# Patient Record
Sex: FEMALE | Race: WHITE | NOT HISPANIC OR LATINO | URBAN - METROPOLITAN AREA
[De-identification: names, ages, dates, MRNs, and addresses within clinical notes are randomized per-mention and may not be internally consistent; named-entity substitution may affect disease eponyms.]

---

## 2017-10-24 PROBLEM — Z00.00 ENCOUNTER FOR PREVENTIVE HEALTH EXAMINATION: Status: ACTIVE | Noted: 2017-10-24

## 2018-06-18 ENCOUNTER — OUTPATIENT (OUTPATIENT)
Dept: OUTPATIENT SERVICES | Facility: HOSPITAL | Age: 68
LOS: 1 days | End: 2018-06-18
Payer: MEDICARE

## 2018-06-18 VITALS — WEIGHT: 164.91 LBS | HEIGHT: 61 IN

## 2018-06-18 VITALS
RESPIRATION RATE: 18 BRPM | WEIGHT: 164.91 LBS | OXYGEN SATURATION: 98 % | DIASTOLIC BLOOD PRESSURE: 78 MMHG | HEIGHT: 61.5 IN | TEMPERATURE: 97 F | SYSTOLIC BLOOD PRESSURE: 130 MMHG | HEART RATE: 73 BPM

## 2018-06-18 DIAGNOSIS — Z01.810 ENCOUNTER FOR PREPROCEDURAL CARDIOVASCULAR EXAMINATION: ICD-10-CM

## 2018-06-18 DIAGNOSIS — Z98.51 TUBAL LIGATION STATUS: Chronic | ICD-10-CM

## 2018-06-18 DIAGNOSIS — Z98.891 HISTORY OF UTERINE SCAR FROM PREVIOUS SURGERY: Chronic | ICD-10-CM

## 2018-06-18 DIAGNOSIS — Z90.49 ACQUIRED ABSENCE OF OTHER SPECIFIED PARTS OF DIGESTIVE TRACT: Chronic | ICD-10-CM

## 2018-06-18 LAB
ANION GAP SERPL CALC-SCNC: 15 MMOL/L — SIGNIFICANT CHANGE UP (ref 5–17)
APTT BLD: 31.5 SEC — SIGNIFICANT CHANGE UP (ref 27.5–37.4)
BLD GP AB SCN SERPL QL: SIGNIFICANT CHANGE UP
BUN SERPL-MCNC: 15 MG/DL — SIGNIFICANT CHANGE UP (ref 8–20)
CALCIUM SERPL-MCNC: 9.4 MG/DL — SIGNIFICANT CHANGE UP (ref 8.6–10.2)
CHLORIDE SERPL-SCNC: 102 MMOL/L — SIGNIFICANT CHANGE UP (ref 98–107)
CO2 SERPL-SCNC: 23 MMOL/L — SIGNIFICANT CHANGE UP (ref 22–29)
CREAT SERPL-MCNC: 0.66 MG/DL — SIGNIFICANT CHANGE UP (ref 0.5–1.3)
GLUCOSE SERPL-MCNC: 97 MG/DL — SIGNIFICANT CHANGE UP (ref 70–115)
HCT VFR BLD CALC: 40.3 % — SIGNIFICANT CHANGE UP (ref 37–47)
HGB BLD-MCNC: 13.2 G/DL — SIGNIFICANT CHANGE UP (ref 12–16)
INR BLD: 0.96 RATIO — SIGNIFICANT CHANGE UP (ref 0.88–1.16)
MAGNESIUM SERPL-MCNC: 2 MG/DL — SIGNIFICANT CHANGE UP (ref 1.6–2.6)
MCHC RBC-ENTMCNC: 27.8 PG — SIGNIFICANT CHANGE UP (ref 27–31)
MCHC RBC-ENTMCNC: 32.8 G/DL — SIGNIFICANT CHANGE UP (ref 32–36)
MCV RBC AUTO: 84.8 FL — SIGNIFICANT CHANGE UP (ref 81–99)
PLATELET # BLD AUTO: 306 K/UL — SIGNIFICANT CHANGE UP (ref 150–400)
POTASSIUM SERPL-MCNC: 4.3 MMOL/L — SIGNIFICANT CHANGE UP (ref 3.5–5.3)
POTASSIUM SERPL-SCNC: 4.3 MMOL/L — SIGNIFICANT CHANGE UP (ref 3.5–5.3)
PROTHROM AB SERPL-ACNC: 10.6 SEC — SIGNIFICANT CHANGE UP (ref 9.8–12.7)
RBC # BLD: 4.75 M/UL — SIGNIFICANT CHANGE UP (ref 4.4–5.2)
RBC # FLD: 14.2 % — SIGNIFICANT CHANGE UP (ref 11–15.6)
SODIUM SERPL-SCNC: 140 MMOL/L — SIGNIFICANT CHANGE UP (ref 135–145)
TYPE + AB SCN PNL BLD: SIGNIFICANT CHANGE UP
WBC # BLD: 8.6 K/UL — SIGNIFICANT CHANGE UP (ref 4.8–10.8)
WBC # FLD AUTO: 8.6 K/UL — SIGNIFICANT CHANGE UP (ref 4.8–10.8)

## 2018-06-18 PROCEDURE — 93010 ELECTROCARDIOGRAM REPORT: CPT

## 2018-06-18 NOTE — H&P PST ADULT - HISTORY OF PRESENT ILLNESS
68 year old female patient with a history of a seasonal allergies, hypertension, dyslipidemia who presents with progressive palpitations associated with dyspnea and fatigue. The patient reports that she recently gained a lot of weight but also reports worsening episodes of palpitations which are starting to limit her exercise capacity. She reports that the palpitations usually occur after exertion and get better with rest. She denies associated chest pain or syncope. No recent fever or chills. She admit that her chronic back pain has limited her exercise which she blames on her weight gain. 68 year old female patient with a history of a seasonal allergies, hypertension, dyslipidemia, GERD, IBS, and chronic back pain who presents with progressive palpitations associated with dyspnea and fatigue. The patient reports that she recently gained a lot of weight but also reports worsening episodes of palpitations which are starting to limit her exercise capacity. She reports that the palpitations usually occur after exertion and get better with rest. She denies associated chest pain or syncope. No recent fever or chills. She admit that her chronic back pain has limited her exercise which she blames on her weight gain.   She does admit to a distant episode of syncope which was attributed to a vasovagal nature    Echo: 4/19/18: EF 65%, grade I diastolic dysfunction.   Nuclear PET: 10/6/17: normal myocardial perfusion imaging without evidence of ischemia.

## 2018-06-18 NOTE — H&P PST ADULT - ASSESSMENT
68 year old female patient with a history of a seasonal allergies, hypertension, dyslipidemia, GERD, IBS, and chronic back pain who presents with progressive palpitations. Plan for EPS possible ablation possible loop, possible pacemaker    - NPO post midnight day of the procedure  - Will hold metoprolol two days prior to procedure.

## 2018-06-18 NOTE — ASU PATIENT PROFILE, ADULT - PMH
Abnormal EKG    HTN (hypertension)    Palpitations    PVC (premature ventricular contraction)  infrequent  RBBB    Syncope  recurrent Abnormal EKG    Compression fracture of lumbar vertebra  L 4--  L7  Compression fracture of thoracic vertebra  T  4--T  7  GERD (gastroesophageal reflux disease)    HTN (hypertension)    IBS (irritable bowel syndrome)    Osteoporosis    Palpitations    Pelvic fracture  hairline fractures  bilateral,  non-traumatic  PVC (premature ventricular contraction)  infrequent  RBBB    Spinal stenosis  L4-  L7  Syncope  recurrent  Thyroid nodule

## 2018-06-21 ENCOUNTER — OUTPATIENT (OUTPATIENT)
Dept: OUTPATIENT SERVICES | Facility: HOSPITAL | Age: 68
LOS: 1 days | End: 2018-06-21
Payer: MEDICARE

## 2018-06-21 ENCOUNTER — TRANSCRIPTION ENCOUNTER (OUTPATIENT)
Age: 68
End: 2018-06-21

## 2018-06-21 VITALS
RESPIRATION RATE: 17 BRPM | SYSTOLIC BLOOD PRESSURE: 151 MMHG | TEMPERATURE: 98 F | OXYGEN SATURATION: 97 % | DIASTOLIC BLOOD PRESSURE: 67 MMHG | HEART RATE: 111 BPM

## 2018-06-21 VITALS
HEART RATE: 93 BPM | OXYGEN SATURATION: 96 % | SYSTOLIC BLOOD PRESSURE: 150 MMHG | DIASTOLIC BLOOD PRESSURE: 87 MMHG | RESPIRATION RATE: 18 BRPM

## 2018-06-21 DIAGNOSIS — Z90.49 ACQUIRED ABSENCE OF OTHER SPECIFIED PARTS OF DIGESTIVE TRACT: Chronic | ICD-10-CM

## 2018-06-21 DIAGNOSIS — R55 SYNCOPE AND COLLAPSE: ICD-10-CM

## 2018-06-21 DIAGNOSIS — Z98.51 TUBAL LIGATION STATUS: Chronic | ICD-10-CM

## 2018-06-21 DIAGNOSIS — Z01.810 ENCOUNTER FOR PREPROCEDURAL CARDIOVASCULAR EXAMINATION: ICD-10-CM

## 2018-06-21 DIAGNOSIS — Z98.891 HISTORY OF UTERINE SCAR FROM PREVIOUS SURGERY: Chronic | ICD-10-CM

## 2018-06-21 LAB — ABO RH CONFIRMATION: SIGNIFICANT CHANGE UP

## 2018-06-21 PROCEDURE — 86850 RBC ANTIBODY SCREEN: CPT

## 2018-06-21 PROCEDURE — 85730 THROMBOPLASTIN TIME PARTIAL: CPT

## 2018-06-21 PROCEDURE — C1730: CPT

## 2018-06-21 PROCEDURE — 86901 BLOOD TYPING SEROLOGIC RH(D): CPT

## 2018-06-21 PROCEDURE — 93620 COMP EP EVL R AT VEN PAC&REC: CPT

## 2018-06-21 PROCEDURE — 80048 BASIC METABOLIC PNL TOTAL CA: CPT

## 2018-06-21 PROCEDURE — 93623 PRGRMD STIMJ&PACG IV RX NFS: CPT

## 2018-06-21 PROCEDURE — C1764: CPT

## 2018-06-21 PROCEDURE — 93613 INTRACARDIAC EPHYS 3D MAPG: CPT

## 2018-06-21 PROCEDURE — 85610 PROTHROMBIN TIME: CPT

## 2018-06-21 PROCEDURE — 86923 COMPATIBILITY TEST ELECTRIC: CPT

## 2018-06-21 PROCEDURE — 36415 COLL VENOUS BLD VENIPUNCTURE: CPT

## 2018-06-21 PROCEDURE — 86900 BLOOD TYPING SEROLOGIC ABO: CPT

## 2018-06-21 PROCEDURE — 83735 ASSAY OF MAGNESIUM: CPT

## 2018-06-21 PROCEDURE — 85027 COMPLETE CBC AUTOMATED: CPT

## 2018-06-21 PROCEDURE — C1732: CPT

## 2018-06-21 PROCEDURE — G0463: CPT

## 2018-06-21 PROCEDURE — 33282: CPT

## 2018-06-21 PROCEDURE — 93005 ELECTROCARDIOGRAM TRACING: CPT

## 2018-06-21 RX ORDER — POTASSIUM CHLORIDE 20 MEQ
10 PACKET (EA) ORAL DAILY
Qty: 0 | Refills: 0 | Status: DISCONTINUED | OUTPATIENT
Start: 2018-06-21 | End: 2018-07-06

## 2018-06-21 RX ORDER — ATORVASTATIN CALCIUM 80 MG/1
20 TABLET, FILM COATED ORAL AT BEDTIME
Qty: 0 | Refills: 0 | Status: DISCONTINUED | OUTPATIENT
Start: 2018-06-21 | End: 2018-07-06

## 2018-06-21 RX ORDER — ALPRAZOLAM 0.25 MG
0.5 TABLET ORAL ONCE
Qty: 0 | Refills: 0 | Status: DISCONTINUED | OUTPATIENT
Start: 2018-06-21 | End: 2018-06-21

## 2018-06-21 RX ORDER — MAGNESIUM OXIDE 400 MG ORAL TABLET 241.3 MG
400 TABLET ORAL DAILY
Qty: 0 | Refills: 0 | Status: DISCONTINUED | OUTPATIENT
Start: 2018-06-21 | End: 2018-07-06

## 2018-06-21 RX ORDER — AMLODIPINE BESYLATE 2.5 MG/1
5 TABLET ORAL DAILY
Qty: 0 | Refills: 0 | Status: DISCONTINUED | OUTPATIENT
Start: 2018-06-21 | End: 2018-07-06

## 2018-06-21 RX ADMIN — Medication 0.5 MILLIGRAM(S): at 15:28

## 2018-06-21 NOTE — DISCHARGE NOTE ADULT - PLAN OF CARE
s/p negative EP study - Bruising at the groin, sometimes extending down the leg, and/or a small lump under the skin at the groin access site is normal and will resolve within 2 – 3 weeks.   - Occasional skipped beats or palpitations that last for a few beats are common and generally resolve within 1-2 months.   - You make walk and take stairs at a regular pace.   - Do not perform any exercise more strenuous than walking for 1 week.   - Do not strain or lift heavy objects for 1 week.  - You may shower the day after the procedure.  - Do not soak in water (such as tub baths, hot tubs, swimming, etc.) for 1 week.   - You may resume all other activities the day after the procedure.  Call your doctor if:   - you notice bleeding, redness, drainage, swelling, increased tenderness or a hot sensation around the catheter insertion site.   - your temperature is greater than 100 degrees F for more than 24 hours.  - your rapid heart rhythm returns.  - you have any questions or concerns regarding the procedure.  If significant bleeding and/or a large lump (the size of a golf ball or bigger) occurs:  - Lie flat and apply continuous direct pressure just above the puncture site for at least 10 minutes  - If the issue resolves, notify your physician immediately.    - If the bleeding cannot be controlled, please seek immediate medical attention.  If you experience increased difficulty breathing or chest pain, or if you faint or have dizzy spells, please seek immediate medical attention. s/p MedTriond loop recorder implant. Loop Recorder Incision Care:     - Remove the plastic and gauze dressing after 24 hours.   - Do not touch the incision until it is completely healed.   - There is Dermabond (skin glue) on your incision, which will start to flake off on its own over the next 2-3 weeks. Do not pick at or peal off the Dermabond.   - Do not apply soaps, creams, lotions, ointments or powders to the incision until it is completely healed.  - You should call the doctor if you notice redness, drainage, swelling, increased tenderness, hot sensation around the  incision, bleeding or incision edges pulling apart.

## 2018-06-21 NOTE — DISCHARGE NOTE ADULT - PATIENT PORTAL LINK FT
You can access the [facility], offered by [facility], by registering with the following website: [email or web address]

## 2018-06-21 NOTE — DISCHARGE NOTE ADULT - MEDICATION SUMMARY - MEDICATIONS TO TAKE
I will START or STAY ON the medications listed below when I get home from the hospital:    calcium  500  mg  chewable  -- 1 tab(s) by mouth once a day  -- Indication: For Supplement    enalapril 10 mg oral tablet  -- 1 tab(s) by mouth once a day  -- Indication: For HTN    Mylanta oral suspension  -- 10 milliliter(s) by mouth once a day, As Needed  -- Indication: For GERD    Allegra 24 Hour Allergy oral tablet  -- 1 tab(s) by mouth once a day  -- Indication: For Antihistamine    Lipitor 20 mg oral tablet  -- 1 tab(s) by mouth once a day  -- Indication: For HLD    Metoprolol Succinate ER 25 mg oral tablet, extended release  -- 0.5 tab(s) by mouth 2 times a day  -- Indication: For HTN    amLODIPine 5 mg oral tablet  -- 1 tab(s) by mouth once a day  -- Indication: For HTN    magnesium oxide 400 mg (241.3 mg elemental magnesium) oral tablet  -- 1 tab(s) by mouth once a day  -- Indication: For Supplement    potassium chloride 10 mEq oral capsule, extended release  -- 1 cap(s) by mouth 2 times a day  -- Indication: For Supplement    Probiotic Formula oral capsule  -- 1 cap(s) by mouth once a day  -- Indication: For Priobiotic    PriLOSEC OTC 20 mg oral delayed release tablet  -- 1 tab(s) by mouth once a day, As Needed  -- Indication: For GERD    Multiple Vitamins oral tablet  -- 1 tab(s) by mouth once a day  -- Indication: For MVI    Vitamin D3 1000 intl units oral tablet  -- 3 tab(s) by mouth once a day  -- Indication: For MVI

## 2018-06-21 NOTE — DISCHARGE NOTE ADULT - HOSPITAL COURSE
68 year old female patient with a history of a seasonal allergies, hypertension, dyslipidemia, GERD, IBS, and chronic back pain who presents with progressive palpitations. Patient now s/p negative EP study and s/p loop recorder implantation.

## 2018-06-21 NOTE — DISCHARGE NOTE ADULT - CARE PLAN
Principal Discharge DX:	Abnormal EKG  Goal:	s/p negative EP study  Assessment and plan of treatment:	- Bruising at the groin, sometimes extending down the leg, and/or a small lump under the skin at the groin access site is normal and will resolve within 2 – 3 weeks.   - Occasional skipped beats or palpitations that last for a few beats are common and generally resolve within 1-2 months.   - You make walk and take stairs at a regular pace.   - Do not perform any exercise more strenuous than walking for 1 week.   - Do not strain or lift heavy objects for 1 week.  - You may shower the day after the procedure.  - Do not soak in water (such as tub baths, hot tubs, swimming, etc.) for 1 week.   - You may resume all other activities the day after the procedure.  Call your doctor if:   - you notice bleeding, redness, drainage, swelling, increased tenderness or a hot sensation around the catheter insertion site.   - your temperature is greater than 100 degrees F for more than 24 hours.  - your rapid heart rhythm returns.  - you have any questions or concerns regarding the procedure.  If significant bleeding and/or a large lump (the size of a golf ball or bigger) occurs:  - Lie flat and apply continuous direct pressure just above the puncture site for at least 10 minutes  - If the issue resolves, notify your physician immediately.    - If the bleeding cannot be controlled, please seek immediate medical attention.  If you experience increased difficulty breathing or chest pain, or if you faint or have dizzy spells, please seek immediate medical attention.  Secondary Diagnosis:	Status post placement of implantable loop recorder  Goal:	s/p Medtronic loop recorder implant.  Assessment and plan of treatment:	Loop Recorder Incision Care:     - Remove the plastic and gauze dressing after 24 hours.   - Do not touch the incision until it is completely healed.   - There is Dermabond (skin glue) on your incision, which will start to flake off on its own over the next 2-3 weeks. Do not pick at or peal off the Dermabond.   - Do not apply soaps, creams, lotions, ointments or powders to the incision until it is completely healed.  - You should call the doctor if you notice redness, drainage, swelling, increased tenderness, hot sensation around the  incision, bleeding or incision edges pulling apart.

## 2018-06-21 NOTE — DISCHARGE NOTE ADULT - CARE PROVIDER_API CALL
Rj Kim), Cardiac Electrophysiology; Cardiovascular Disease; Internal Medicine  [address] [place] [address]  Phone: [phone]  Fax: [phone]

## 2018-09-18 ENCOUNTER — TRANSCRIPTION ENCOUNTER (OUTPATIENT)
Age: 68
End: 2018-09-18

## 2019-03-27 ENCOUNTER — TRANSCRIPTION ENCOUNTER (OUTPATIENT)
Age: 69
End: 2019-03-27

## 2019-03-28 PROBLEM — I49.3 VENTRICULAR PREMATURE DEPOLARIZATION: Chronic | Status: ACTIVE | Noted: 2018-06-18

## 2019-03-28 PROBLEM — K58.9 IRRITABLE BOWEL SYNDROME, UNSPECIFIED: Chronic | Status: ACTIVE | Noted: 2018-06-18

## 2019-03-28 PROBLEM — I45.10 UNSPECIFIED RIGHT BUNDLE-BRANCH BLOCK: Chronic | Status: ACTIVE | Noted: 2018-06-18

## 2019-03-28 PROBLEM — I10 ESSENTIAL (PRIMARY) HYPERTENSION: Chronic | Status: ACTIVE | Noted: 2018-06-18

## 2019-03-28 PROBLEM — M48.00 SPINAL STENOSIS, SITE UNSPECIFIED: Chronic | Status: ACTIVE | Noted: 2018-06-18

## 2019-03-28 PROBLEM — S22.000A WEDGE COMPRESSION FRACTURE OF UNSPECIFIED THORACIC VERTEBRA, INITIAL ENCOUNTER FOR CLOSED FRACTURE: Chronic | Status: ACTIVE | Noted: 2018-06-18

## 2019-03-28 PROBLEM — R00.2 PALPITATIONS: Chronic | Status: ACTIVE | Noted: 2018-06-18

## 2019-03-28 PROBLEM — M81.0 AGE-RELATED OSTEOPOROSIS WITHOUT CURRENT PATHOLOGICAL FRACTURE: Chronic | Status: ACTIVE | Noted: 2018-06-18

## 2019-03-28 PROBLEM — R94.31 ABNORMAL ELECTROCARDIOGRAM [ECG] [EKG]: Chronic | Status: ACTIVE | Noted: 2018-06-18

## 2019-03-28 PROBLEM — E04.1 NONTOXIC SINGLE THYROID NODULE: Chronic | Status: ACTIVE | Noted: 2018-06-18

## 2019-03-28 PROBLEM — K58.9 IRRITABLE BOWEL SYNDROME WITHOUT DIARRHEA: Chronic | Status: ACTIVE | Noted: 2018-06-18

## 2019-03-28 PROBLEM — S32.9XXA FRACTURE OF UNSPECIFIED PARTS OF LUMBOSACRAL SPINE AND PELVIS, INITIAL ENCOUNTER FOR CLOSED FRACTURE: Chronic | Status: ACTIVE | Noted: 2018-06-18

## 2019-03-28 PROBLEM — K21.9 GASTRO-ESOPHAGEAL REFLUX DISEASE WITHOUT ESOPHAGITIS: Chronic | Status: ACTIVE | Noted: 2018-06-18

## 2019-03-28 PROBLEM — S32.000A WEDGE COMPRESSION FRACTURE OF UNSPECIFIED LUMBAR VERTEBRA, INITIAL ENCOUNTER FOR CLOSED FRACTURE: Chronic | Status: ACTIVE | Noted: 2018-06-18

## 2019-03-29 RX ORDER — METOPROLOL TARTRATE 50 MG
0.5 TABLET ORAL
Qty: 0 | Refills: 0 | COMMUNITY

## 2019-03-29 RX ORDER — CHOLECALCIFEROL (VITAMIN D3) 125 MCG
3 CAPSULE ORAL
Qty: 0 | Refills: 0 | COMMUNITY

## 2019-03-29 RX ORDER — POTASSIUM CHLORIDE 20 MEQ
1 PACKET (EA) ORAL
Qty: 0 | Refills: 0 | COMMUNITY

## 2019-03-29 NOTE — H&P ADULT - ASSESSMENT
68 year old female with PMHx of HTN, HLD, IBS, RBBB, syncopal episode x2 (few years ago), s/p ILR (2018), SVT (2 months ago) presented to cardiologist with increasing SOB on exertion underwent stress test, which revealed moderate mid and distal inferoapical wall ischemia. Pt referred for cardiac cath with possible PCI.   - risks and benefits of procedure reviewed, all questions answered   - informed consent obtained, pt verbalized understanding.

## 2019-03-29 NOTE — H&P ADULT - HISTORY OF PRESENT ILLNESS
68 year 9ld woman with PMHx presented to cardiologist with increasing SOB on exertion 68 year old female with PMHx of HTN, HLD, IBS, RBBB, syncopal episode x2 (few years ago), s/p ILR (2018), SVT (2 months ago) presented to cardiologist with increasing SOB on exertion underwent stress test, which revealed moderate mid and distal inferoapical wall ischemia. Pt referred for cardiac cath with possible PCI (bleeding risk: 2.5%)

## 2019-03-29 NOTE — H&P ADULT - NSHPLABSRESULTS_GEN_ALL_CORE
3/13/19 EKG: NSR  4/19/18 Echo: EF 33% 3/13/19 EKG: NSR  4/19/18 Echo: EF 33%  3/26/19 Stress test:  moderate ischemia in mid and distal inferoapical wall, resting EF 80%, stress EF: 82%

## 2019-03-29 NOTE — H&P ADULT - NSHPREVIEWOFSYSTEMS_GEN_ALL_CORE
General: Pt denies recent weight loss/fever/chills    Neurological: + seizure episode 4 years ago, Migraines, denies numbness or  sensation loss    Cardiovascular: + palpitation at times, denies chest pain/leg edema    Respiratory and Thorax: + dry cough, + sob, denies wheezing or sputum     Gastrointestinal: + IBS    Genitourinary: denies urinary frequency/urgency/ dysuria    Musculoskeletal: denies joint pain or swelling, denies restricted motion    Hematologic: denies abnormal bleeding

## 2019-03-29 NOTE — H&P ADULT - NSHPPHYSICALEXAM_GEN_ALL_CORE
Constitutional: well developed, well nourished, no deformities and no acute distress    Neurological: Alert & Oriented x 3, CHARLES, no focal deficits    HEENT: NC/AT, PERRLA, EOMI,  Neck supple.    Respiratory: CTA B/L, No wheezing/crackles/rhonchi    Cardiovascular: (+) S1 & S2, RRR, No m/r/g    Gastrointestinal: soft, NT, nondistended, (+) BS    Genitourinary: non distended bladder, voiding freely    Extremities: No pedal edema, No clubbing, No cyanosis    Skin:  normal skin color and pigmentation, no skin lesions

## 2019-03-29 NOTE — H&P ADULT - NSICDXFAMILYHX_GEN_ALL_CORE_FT
FAMILY HISTORY:  FH: cancer, father, mohter  FH: CHF (congestive heart failure), father  FH: heart attack, grandfather

## 2019-03-29 NOTE — H&P ADULT - NSICDXPASTMEDICALHX_GEN_ALL_CORE_FT
PAST MEDICAL HISTORY:  Abnormal EKG     Compression fracture of lumbar vertebra L 4--  L7    Compression fracture of thoracic vertebra T  4--T  7    GERD (gastroesophageal reflux disease)     HTN (hypertension)     IBS (irritable bowel syndrome)     Osteoporosis     Palpitations     Pelvic fracture hairline fractures  bilateral,  non-traumatic    PVC (premature ventricular contraction) infrequent    RBBB     Spinal stenosis L4-  L7    Syncope recurrent    Thyroid nodule PAST MEDICAL HISTORY:  Abnormal EKG     Compression fracture of lumbar vertebra L 4--  L7    Compression fracture of thoracic vertebra T  4--T  7    GERD (gastroesophageal reflux disease)     HTN (hypertension)     IBS (irritable bowel syndrome)     Osteoporosis     Osteoporosis     Palpitations     Pelvic fracture hairline fractures  bilateral,  non-traumatic    PVC (premature ventricular contraction) infrequent    RBBB     Spinal stenosis L4-  L7    Syncope recurrent    Thyroid nodule

## 2019-04-01 ENCOUNTER — OUTPATIENT (OUTPATIENT)
Dept: OUTPATIENT SERVICES | Facility: HOSPITAL | Age: 69
LOS: 1 days | Discharge: HOME | End: 2019-04-01

## 2019-04-01 VITALS
OXYGEN SATURATION: 98 % | SYSTOLIC BLOOD PRESSURE: 110 MMHG | RESPIRATION RATE: 16 BRPM | HEART RATE: 73 BPM | DIASTOLIC BLOOD PRESSURE: 75 MMHG

## 2019-04-01 VITALS
OXYGEN SATURATION: 99 % | SYSTOLIC BLOOD PRESSURE: 111 MMHG | RESPIRATION RATE: 16 BRPM | DIASTOLIC BLOOD PRESSURE: 57 MMHG | HEART RATE: 66 BPM

## 2019-04-01 DIAGNOSIS — Z90.49 ACQUIRED ABSENCE OF OTHER SPECIFIED PARTS OF DIGESTIVE TRACT: Chronic | ICD-10-CM

## 2019-04-01 DIAGNOSIS — Z98.891 HISTORY OF UTERINE SCAR FROM PREVIOUS SURGERY: Chronic | ICD-10-CM

## 2019-04-01 DIAGNOSIS — Z98.51 TUBAL LIGATION STATUS: Chronic | ICD-10-CM

## 2019-04-01 LAB
HCV AB S/CO SERPL IA: 0.34 S/CO — SIGNIFICANT CHANGE UP (ref 0–0.79)
HCV AB SERPL-IMP: SIGNIFICANT CHANGE UP

## 2019-04-01 NOTE — PROGRESS NOTE ADULT - SUBJECTIVE AND OBJECTIVE BOX
HPI:  68 year old female with PMHx of HTN, HLD, IBS, RBBB, syncopal episode x2 (few years ago), s/p ILR (2018), SVT (2 months ago) presented to cardiologist with increasing SOB on exertion underwent stress test, which revealed moderate mid and distal inferoapical wall ischemia. Pt referred for cardiac cath with possible PCI (bleeding risk: 2.5%)  Now, Pt is s/p LHC, revealed diffused non-obstructive CAD.     ROS: denies chest pain/ pressure, SOB or palpitation     Physical exam:   General: awake, no acute distress   HEENT: NCAT, neck supple   CV: RRR, normal S1S2, no murmur/ rub   Pulmonary: clear, no wheezing or rales   GI: +BS, soft, non-tender, non-distended   : voiding freely   Extremities: no edema, + pedal pulses   Skin: no rashes or lesion. Rt. femoral access site (s/p 5 fr. sheath manual pull): no hematoma or bleeding     #  s/p LHC, revealed diffused non-obstructive CAD.   - post procedure, outcome and follow up care reviewed with patient/ family   - continue ASA   - continue BB/ CCB/ statin  - discharge home today   - follow up with Dr. Sweeney in 4-7 days

## 2019-04-03 DIAGNOSIS — I49.3 VENTRICULAR PREMATURE DEPOLARIZATION: ICD-10-CM

## 2019-04-03 DIAGNOSIS — R55 SYNCOPE AND COLLAPSE: ICD-10-CM

## 2019-04-03 DIAGNOSIS — M81.0 AGE-RELATED OSTEOPOROSIS WITHOUT CURRENT PATHOLOGICAL FRACTURE: ICD-10-CM

## 2019-04-03 DIAGNOSIS — I45.10 UNSPECIFIED RIGHT BUNDLE-BRANCH BLOCK: ICD-10-CM

## 2019-04-03 DIAGNOSIS — I25.10 ATHEROSCLEROTIC HEART DISEASE OF NATIVE CORONARY ARTERY WITHOUT ANGINA PECTORIS: ICD-10-CM

## 2019-04-03 DIAGNOSIS — E78.00 PURE HYPERCHOLESTEROLEMIA, UNSPECIFIED: ICD-10-CM

## 2019-04-03 DIAGNOSIS — K21.9 GASTRO-ESOPHAGEAL REFLUX DISEASE WITHOUT ESOPHAGITIS: ICD-10-CM

## 2019-04-03 DIAGNOSIS — I10 ESSENTIAL (PRIMARY) HYPERTENSION: ICD-10-CM

## 2019-04-03 DIAGNOSIS — I20.9 ANGINA PECTORIS, UNSPECIFIED: ICD-10-CM

## 2019-08-16 PROBLEM — M81.0 AGE-RELATED OSTEOPOROSIS WITHOUT CURRENT PATHOLOGICAL FRACTURE: Chronic | Status: ACTIVE | Noted: 2019-04-01

## 2019-08-19 ENCOUNTER — APPOINTMENT (OUTPATIENT)
Dept: SURGICAL ONCOLOGY | Facility: CLINIC | Age: 69
End: 2019-08-19
Payer: MEDICARE

## 2019-08-19 VITALS
TEMPERATURE: 98.4 F | BODY MASS INDEX: 31.72 KG/M2 | DIASTOLIC BLOOD PRESSURE: 83 MMHG | WEIGHT: 168 LBS | OXYGEN SATURATION: 94 % | RESPIRATION RATE: 16 BRPM | SYSTOLIC BLOOD PRESSURE: 133 MMHG | HEIGHT: 61 IN | HEART RATE: 64 BPM

## 2019-08-19 DIAGNOSIS — K31.9 DISEASE OF STOMACH AND DUODENUM, UNSPECIFIED: ICD-10-CM

## 2019-08-19 DIAGNOSIS — K57.90 DIVERTICULOSIS OF INTESTINE, PART UNSPECIFIED, W/OUT PERFORATION OR ABSCESS W/OUT BLEEDING: ICD-10-CM

## 2019-08-19 DIAGNOSIS — E07.9 DISORDER OF THYROID, UNSPECIFIED: ICD-10-CM

## 2019-08-19 DIAGNOSIS — Z87.898 PERSONAL HISTORY OF OTHER SPECIFIED CONDITIONS: ICD-10-CM

## 2019-08-19 DIAGNOSIS — G43.909 MIGRAINE, UNSPECIFIED, NOT INTRACTABLE, W/OUT STATUS MIGRAINOSUS: ICD-10-CM

## 2019-08-19 DIAGNOSIS — I51.9 HEART DISEASE, UNSPECIFIED: ICD-10-CM

## 2019-08-19 DIAGNOSIS — Z87.19 PERSONAL HISTORY OF OTHER DISEASES OF THE DIGESTIVE SYSTEM: ICD-10-CM

## 2019-08-19 DIAGNOSIS — Z86.79 PERSONAL HISTORY OF OTHER DISEASES OF THE CIRCULATORY SYSTEM: ICD-10-CM

## 2019-08-19 DIAGNOSIS — M25.50 PAIN IN UNSPECIFIED JOINT: ICD-10-CM

## 2019-08-19 DIAGNOSIS — Z85.828 PERSONAL HISTORY OF OTHER MALIGNANT NEOPLASM OF SKIN: ICD-10-CM

## 2019-08-19 DIAGNOSIS — Z86.69 PERSONAL HISTORY OF OTHER DISEASES OF THE NERVOUS SYSTEM AND SENSE ORGANS: ICD-10-CM

## 2019-08-19 PROCEDURE — 99205 OFFICE O/P NEW HI 60 MIN: CPT

## 2019-08-19 NOTE — ASSESSMENT
[FreeTextEntry1] : Imp:\par IDC\par \par LT Breast CA. 4mm. Receptors ER +, MO - her2 serena Fish pending\par \par The patients and I discussed the surgical management of breast cancer. I explained that breast cancer can be treated with 2 main surgical approaches. One is a breast conservations therapy. The other is mastectomy with or without immediate reconstruction by a plastic surgeon. Breast conserving therapy involves a wide excision of the involved area. Negative margins must be achieved with the wide excision. In addition, evaluation if the lymph nodes either with a sentinel lymph node biopsy or an axillary lymph node dissection may be required. This treatment usually requires postoperative radiation to the breast. The mastectomy also involves node dissection. Postoperative radiation therapy may be needed even after mastectomy in certain advanced cases.\par \par \par Plan:\par Pt and I discussed the benefits of  conservative therapy over mastectomy. Pt desires conservative therapy at this time. \par WIll review imaging.

## 2019-08-19 NOTE — PHYSICAL EXAM
[Normal] : supple, no neck mass and thyroid not enlarged [Normal Neck Lymph Nodes] : normal neck lymph nodes  [Normal Groin Lymph Nodes] : normal groin lymph nodes [Normal] : oriented to person, place and time, with appropriate affect [FreeTextEntry1] : I, Kev Medrano was present for the physical exam\par \par \par \par  [de-identified] : Normal S1, S2. Regular rate and rhythm\par \par  [de-identified] : Complete normal breast examination performed supine and upright revealed no palpable masses, nipple discharge, inversion, deviation or enlarge axillary lymph nodes, or supraclavicular lymph nodes. Some ecchymosis between 10-11:00 on LT breast secondary to biopsy.  [de-identified] : Clear breath sounds bilaterally, normal respiratory effort\par \par

## 2019-08-19 NOTE — CONSULT LETTER
[Dear  ___] : Dear  [unfilled], [Consult Letter:] : I had the pleasure of evaluating your patient, [unfilled]. [Please see my note below.] : Please see my note below. [Sincerely,] : Sincerely, [Consult Closing:] : Thank you very much for allowing me to participate in the care of this patient.  If you have any questions, please do not hesitate to contact me. [FreeTextEntry2] : [address]\[place] [address] [FreeTextEntry3] : Edd Reyes M.D.\par \par \par \par

## 2019-08-19 NOTE — HISTORY OF PRESENT ILLNESS
[de-identified] : 70 y/o female patient presents for an initial consultation. \par \par Pathology 19: LT breast 10:00 9cmfn: Moderately differentiated invasive ductal carcinoma with micropapillary growth pattern measuring 0.5 cm in maximal length in this material. \par \par FMHx of Breast CA from mother at 58 y/o. Mother is . \par Pt has a hx of HTN, asthma, RT bundle branch block catheter negative dx on 2019, bronchitis, Hep A in , GERD, IBS. In addition to Eclampsia in , Vasovagal, and skin CA. \par \par \par Menarche: 12 y/o\par LMP: \par \par Age at first pregnancy: 24 y/o\par NKDA

## 2019-08-25 ENCOUNTER — FORM ENCOUNTER (OUTPATIENT)
Age: 69
End: 2019-08-25

## 2019-08-26 ENCOUNTER — RESULT REVIEW (OUTPATIENT)
Age: 69
End: 2019-08-26

## 2019-08-26 ENCOUNTER — OUTPATIENT (OUTPATIENT)
Dept: OUTPATIENT SERVICES | Facility: HOSPITAL | Age: 69
LOS: 1 days | End: 2019-08-26
Payer: SELF-PAY

## 2019-08-26 ENCOUNTER — APPOINTMENT (OUTPATIENT)
Dept: MRI IMAGING | Facility: CLINIC | Age: 69
End: 2019-08-26
Payer: MEDICARE

## 2019-08-26 ENCOUNTER — OUTPATIENT (OUTPATIENT)
Dept: OUTPATIENT SERVICES | Facility: HOSPITAL | Age: 69
LOS: 1 days | End: 2019-08-26

## 2019-08-26 DIAGNOSIS — Z90.49 ACQUIRED ABSENCE OF OTHER SPECIFIED PARTS OF DIGESTIVE TRACT: Chronic | ICD-10-CM

## 2019-08-26 DIAGNOSIS — Z98.891 HISTORY OF UTERINE SCAR FROM PREVIOUS SURGERY: Chronic | ICD-10-CM

## 2019-08-26 DIAGNOSIS — Z98.51 TUBAL LIGATION STATUS: Chronic | ICD-10-CM

## 2019-08-26 DIAGNOSIS — C50.919 MALIGNANT NEOPLASM OF UNSPECIFIED SITE OF UNSPECIFIED FEMALE BREAST: ICD-10-CM

## 2019-08-26 DIAGNOSIS — C50.912 MALIGNANT NEOPLASM OF UNSPECIFIED SITE OF LEFT FEMALE BREAST: ICD-10-CM

## 2019-08-26 LAB — SURGICAL PATHOLOGY STUDY: SIGNIFICANT CHANGE UP

## 2019-08-26 PROCEDURE — 88321 CONSLTJ&REPRT SLD PREP ELSWR: CPT

## 2019-08-26 PROCEDURE — 77049 MRI BREAST C-+ W/CAD BI: CPT | Mod: 26

## 2019-08-29 ENCOUNTER — OUTPATIENT (OUTPATIENT)
Dept: OUTPATIENT SERVICES | Facility: HOSPITAL | Age: 69
LOS: 1 days | End: 2019-08-29
Payer: MEDICARE

## 2019-08-29 VITALS
HEIGHT: 61 IN | OXYGEN SATURATION: 96 % | TEMPERATURE: 99 F | HEART RATE: 64 BPM | RESPIRATION RATE: 14 BRPM | SYSTOLIC BLOOD PRESSURE: 142 MMHG | WEIGHT: 171.96 LBS | DIASTOLIC BLOOD PRESSURE: 66 MMHG

## 2019-08-29 DIAGNOSIS — C80.1 MALIGNANT (PRIMARY) NEOPLASM, UNSPECIFIED: ICD-10-CM

## 2019-08-29 DIAGNOSIS — Z98.51 TUBAL LIGATION STATUS: Chronic | ICD-10-CM

## 2019-08-29 DIAGNOSIS — Z90.49 ACQUIRED ABSENCE OF OTHER SPECIFIED PARTS OF DIGESTIVE TRACT: Chronic | ICD-10-CM

## 2019-08-29 DIAGNOSIS — C50.912 MALIGNANT NEOPLASM OF UNSPECIFIED SITE OF LEFT FEMALE BREAST: ICD-10-CM

## 2019-08-29 DIAGNOSIS — Z98.891 HISTORY OF UTERINE SCAR FROM PREVIOUS SURGERY: Chronic | ICD-10-CM

## 2019-08-29 DIAGNOSIS — R00.2 PALPITATIONS: Chronic | ICD-10-CM

## 2019-08-29 LAB
ALBUMIN SERPL ELPH-MCNC: 4.5 G/DL — SIGNIFICANT CHANGE UP (ref 3.3–5)
ALP SERPL-CCNC: 111 U/L — SIGNIFICANT CHANGE UP (ref 40–120)
ALT FLD-CCNC: 19 U/L — SIGNIFICANT CHANGE UP (ref 4–33)
ANION GAP SERPL CALC-SCNC: 14 MMO/L — SIGNIFICANT CHANGE UP (ref 7–14)
AST SERPL-CCNC: 22 U/L — SIGNIFICANT CHANGE UP (ref 4–32)
BILIRUB SERPL-MCNC: 0.7 MG/DL — SIGNIFICANT CHANGE UP (ref 0.2–1.2)
BUN SERPL-MCNC: 14 MG/DL — SIGNIFICANT CHANGE UP (ref 7–23)
CALCIUM SERPL-MCNC: 10.2 MG/DL — SIGNIFICANT CHANGE UP (ref 8.4–10.5)
CHLORIDE SERPL-SCNC: 101 MMOL/L — SIGNIFICANT CHANGE UP (ref 98–107)
CO2 SERPL-SCNC: 24 MMOL/L — SIGNIFICANT CHANGE UP (ref 22–31)
CREAT SERPL-MCNC: 0.89 MG/DL — SIGNIFICANT CHANGE UP (ref 0.5–1.3)
GLUCOSE SERPL-MCNC: 87 MG/DL — SIGNIFICANT CHANGE UP (ref 70–99)
HCT VFR BLD CALC: 41.5 % — SIGNIFICANT CHANGE UP (ref 34.5–45)
HGB BLD-MCNC: 13.5 G/DL — SIGNIFICANT CHANGE UP (ref 11.5–15.5)
MCHC RBC-ENTMCNC: 28.2 PG — SIGNIFICANT CHANGE UP (ref 27–34)
MCHC RBC-ENTMCNC: 32.5 % — SIGNIFICANT CHANGE UP (ref 32–36)
MCV RBC AUTO: 86.8 FL — SIGNIFICANT CHANGE UP (ref 80–100)
NRBC # FLD: 0 K/UL — SIGNIFICANT CHANGE UP (ref 0–0)
PLATELET # BLD AUTO: 325 K/UL — SIGNIFICANT CHANGE UP (ref 150–400)
PMV BLD: 9.9 FL — SIGNIFICANT CHANGE UP (ref 7–13)
POTASSIUM SERPL-MCNC: 3.7 MMOL/L — SIGNIFICANT CHANGE UP (ref 3.5–5.3)
POTASSIUM SERPL-SCNC: 3.7 MMOL/L — SIGNIFICANT CHANGE UP (ref 3.5–5.3)
PROT SERPL-MCNC: 7.3 G/DL — SIGNIFICANT CHANGE UP (ref 6–8.3)
RBC # BLD: 4.78 M/UL — SIGNIFICANT CHANGE UP (ref 3.8–5.2)
RBC # FLD: 14.7 % — HIGH (ref 10.3–14.5)
SODIUM SERPL-SCNC: 139 MMOL/L — SIGNIFICANT CHANGE UP (ref 135–145)
WBC # BLD: 7.54 K/UL — SIGNIFICANT CHANGE UP (ref 3.8–10.5)
WBC # FLD AUTO: 7.54 K/UL — SIGNIFICANT CHANGE UP (ref 3.8–10.5)

## 2019-08-29 PROCEDURE — 93010 ELECTROCARDIOGRAM REPORT: CPT

## 2019-08-29 RX ORDER — AMLODIPINE BESYLATE 2.5 MG/1
1 TABLET ORAL
Qty: 0 | Refills: 0 | DISCHARGE

## 2019-08-29 RX ORDER — ATORVASTATIN CALCIUM 80 MG/1
1 TABLET, FILM COATED ORAL
Qty: 0 | Refills: 0 | DISCHARGE

## 2019-08-29 RX ORDER — POTASSIUM CHLORIDE 20 MEQ
1 PACKET (EA) ORAL
Qty: 0 | Refills: 0 | DISCHARGE

## 2019-08-29 RX ORDER — PANTOPRAZOLE SODIUM 20 MG/1
1 TABLET, DELAYED RELEASE ORAL
Qty: 0 | Refills: 0 | DISCHARGE

## 2019-08-29 RX ORDER — DILTIAZEM HCL 120 MG
0 CAPSULE, EXT RELEASE 24 HR ORAL
Qty: 0 | Refills: 0 | DISCHARGE

## 2019-08-29 RX ORDER — FEXOFENADINE HCL 30 MG
1 TABLET ORAL
Qty: 0 | Refills: 0 | DISCHARGE

## 2019-08-29 RX ORDER — METOPROLOL TARTRATE 50 MG
0.5 TABLET ORAL
Qty: 0 | Refills: 0 | DISCHARGE

## 2019-08-29 RX ORDER — MAGNESIUM OXIDE 400 MG ORAL TABLET 241.3 MG
1 TABLET ORAL
Qty: 0 | Refills: 0 | DISCHARGE

## 2019-08-29 RX ORDER — CHOLECALCIFEROL (VITAMIN D3) 125 MCG
1 CAPSULE ORAL
Qty: 0 | Refills: 0 | DISCHARGE

## 2019-08-29 RX ORDER — L.ACIDOPH/B.ANIMALIS/B.LONGUM 15B CELL
1 CAPSULE ORAL
Qty: 0 | Refills: 0 | DISCHARGE

## 2019-08-29 RX ORDER — OMEPRAZOLE 10 MG/1
1 CAPSULE, DELAYED RELEASE ORAL
Qty: 0 | Refills: 0 | DISCHARGE

## 2019-08-29 RX ORDER — ASPIRIN/CALCIUM CARB/MAGNESIUM 324 MG
1 TABLET ORAL
Qty: 0 | Refills: 0 | DISCHARGE

## 2019-08-29 NOTE — H&P PST ADULT - RS GEN PE MLT RESP DETAILS PC
clear to auscultation bilaterally/breath sounds equal/respirations non-labored/airway patent/good air movement good air movement/no wheezes/no rales/breath sounds equal/respirations non-labored/clear to auscultation bilaterally/no chest wall tenderness/no intercostal retractions/no rhonchi

## 2019-08-29 NOTE — H&P PST ADULT - GASTROINTESTINAL DETAILS
soft/no masses palpable/no distention/nontender no rigidity/nontender/soft/no masses palpable/no guarding/no organomegaly/obese

## 2019-08-29 NOTE — H&P PST ADULT - NSICDXPASTSURGICALHX_GEN_ALL_CORE_FT
PAST SURGICAL HISTORY:  H/O tubal ligation     H/O:  x  2    History of cholecystectomy PAST SURGICAL HISTORY:  H/O tubal ligation 40y/o    H/O:  ,     History of cholecystectomy 2004

## 2019-08-29 NOTE — H&P PST ADULT - CARDIOVASCULAR COMMENTS
hx of palpitations for the past 10 yrs, occurs with anxiety, lasting less than a min, last episode 6 months ago, symptoms have improved, followed by cardiologist MAOQ was placed. left chest [id number] implanted

## 2019-08-29 NOTE — H&P PST ADULT - NSICDXPASTMEDICALHX_GEN_ALL_CORE_FT
PAST MEDICAL HISTORY:  Abnormal EKG     Compression fracture of lumbar vertebra L 4--  L7    Compression fracture of thoracic vertebra T  4--T  7    GERD (gastroesophageal reflux disease)     HTN (hypertension)     IBS (irritable bowel syndrome)     Osteoporosis     Osteoporosis     Palpitations     Pelvic fracture hairline fractures  bilateral,  non-traumatic    PVC (premature ventricular contraction) infrequent    RBBB     Spinal stenosis L4-  L7    Syncope recurrent    Thyroid nodule PAST MEDICAL HISTORY:  Abnormal EKG     Compression fracture of lumbar vertebra L 4--  L7    Compression fracture of thoracic vertebra T  4--T  7    GERD (gastroesophageal reflux disease)     Hepatitis A 1975    HTN (hypertension)     IBS (irritable bowel syndrome)     Malignant neoplasm of breast (female)     Osteoporosis     Osteoporosis     Palpitations     Pelvic fracture hairline fractures  bilateral,  non-traumatic    PVC (premature ventricular contraction) infrequent    RBBB     Seasonal allergies     Spinal stenosis L4-  L7    Thyroid nodule     Vaso vagal episode

## 2019-08-29 NOTE — H&P PST ADULT - NEGATIVE GENERAL SYMPTOMS
no malaise/no weight gain/no polyphagia/no fever/no chills/no sweating/no anorexia/no weight loss/no polyuria/no polydipsia/no fatigue

## 2019-08-29 NOTE — H&P PST ADULT - NSANTHOSAYNRD_GEN_A_CORE
No. NELY screening performed.  STOP BANG Legend: 0-2 = LOW Risk; 3-4 = INTERMEDIATE Risk; 5-8 = HIGH Risk

## 2019-08-29 NOTE — H&P PST ADULT - NSICDXPROBLEM_GEN_ALL_CORE_FT
PROBLEM DIAGNOSES  Problem: Malignant neoplasm  Assessment and Plan: Pt scheduled for left breast lumpectomy post daina  reflector placement left sentinel lymph node biopsy on 9/11/2019. labs done results pending, ekg done.  Hibiclens provided;  verbal and written instructions provided, with teach back, pt able to verbalize understanding.  Preop teaching done, pt able to verbalize understanding.  Pt with multiple comorbidities scheduled for cardiology eval as per surgeon, pst will also request.

## 2019-08-29 NOTE — H&P PST ADULT - NEGATIVE ENMT SYMPTOMS
no gum bleeding/no throat pain/no dysphagia/no vertigo/no sinus symptoms/no recurrent cold sores/no abnormal taste sensation/no ear pain/no tinnitus/no nasal discharge/no nasal congestion/no nose bleeds/no hearing difficulty/no dry mouth

## 2019-08-29 NOTE — H&P PST ADULT - HISTORY OF PRESENT ILLNESS
68y/o left breast lumpectomy post daina  reflector placement left sentinel lymph node biopsy on 9/11/2019.  Pt states, "mother hx breast cancer, went for routine mammogram 7/2019.  US was done, bx positive for ductal carcinoma."

## 2019-08-29 NOTE — H&P PST ADULT - NEGATIVE GENERAL GENITOURINARY SYMPTOMS
no dysuria/no bladder infections/no hematuria/no flank pain R/no flank pain L/normal urinary frequency

## 2019-09-02 ENCOUNTER — FORM ENCOUNTER (OUTPATIENT)
Age: 69
End: 2019-09-02

## 2019-09-03 ENCOUNTER — APPOINTMENT (OUTPATIENT)
Dept: ULTRASOUND IMAGING | Facility: IMAGING CENTER | Age: 69
End: 2019-09-03
Payer: MEDICARE

## 2019-09-03 ENCOUNTER — OUTPATIENT (OUTPATIENT)
Dept: OUTPATIENT SERVICES | Facility: HOSPITAL | Age: 69
LOS: 1 days | End: 2019-09-03
Payer: MEDICARE

## 2019-09-03 DIAGNOSIS — Z00.8 ENCOUNTER FOR OTHER GENERAL EXAMINATION: ICD-10-CM

## 2019-09-03 DIAGNOSIS — Z98.891 HISTORY OF UTERINE SCAR FROM PREVIOUS SURGERY: Chronic | ICD-10-CM

## 2019-09-03 DIAGNOSIS — R00.2 PALPITATIONS: Chronic | ICD-10-CM

## 2019-09-03 DIAGNOSIS — Z98.51 TUBAL LIGATION STATUS: Chronic | ICD-10-CM

## 2019-09-03 DIAGNOSIS — Z90.49 ACQUIRED ABSENCE OF OTHER SPECIFIED PARTS OF DIGESTIVE TRACT: Chronic | ICD-10-CM

## 2019-09-03 PROBLEM — J30.2 OTHER SEASONAL ALLERGIC RHINITIS: Chronic | Status: ACTIVE | Noted: 2019-08-29

## 2019-09-03 PROBLEM — C50.919 MALIGNANT NEOPLASM OF UNSPECIFIED SITE OF UNSPECIFIED FEMALE BREAST: Chronic | Status: ACTIVE | Noted: 2019-08-29

## 2019-09-03 PROBLEM — R55 SYNCOPE AND COLLAPSE: Chronic | Status: ACTIVE | Noted: 2019-08-29

## 2019-09-03 PROBLEM — B15.9 HEPATITIS A WITHOUT HEPATIC COMA: Chronic | Status: ACTIVE | Noted: 2019-08-29

## 2019-09-03 PROCEDURE — 19285 PERQ DEV BREAST 1ST US IMAG: CPT

## 2019-09-03 PROCEDURE — 19285 PERQ DEV BREAST 1ST US IMAG: CPT | Mod: LT

## 2019-09-03 PROCEDURE — C1739: CPT

## 2019-09-10 ENCOUNTER — TRANSCRIPTION ENCOUNTER (OUTPATIENT)
Age: 69
End: 2019-09-10

## 2019-09-10 ENCOUNTER — FORM ENCOUNTER (OUTPATIENT)
Age: 69
End: 2019-09-10

## 2019-09-11 ENCOUNTER — OUTPATIENT (OUTPATIENT)
Dept: OUTPATIENT SERVICES | Facility: HOSPITAL | Age: 69
LOS: 1 days | Discharge: HOME | End: 2019-09-11
Payer: MEDICARE

## 2019-09-11 ENCOUNTER — APPOINTMENT (OUTPATIENT)
Dept: MAMMOGRAPHY | Facility: IMAGING CENTER | Age: 69
End: 2019-09-11
Payer: MEDICARE

## 2019-09-11 ENCOUNTER — APPOINTMENT (OUTPATIENT)
Dept: NUCLEAR MEDICINE | Facility: IMAGING CENTER | Age: 69
End: 2019-09-11
Payer: MEDICARE

## 2019-09-11 ENCOUNTER — OUTPATIENT (OUTPATIENT)
Dept: OUTPATIENT SERVICES | Facility: HOSPITAL | Age: 69
LOS: 1 days | End: 2019-09-11
Payer: COMMERCIAL

## 2019-09-11 ENCOUNTER — RESULT REVIEW (OUTPATIENT)
Age: 69
End: 2019-09-11

## 2019-09-11 ENCOUNTER — APPOINTMENT (OUTPATIENT)
Dept: SURGICAL ONCOLOGY | Facility: AMBULATORY SURGERY CENTER | Age: 69
End: 2019-09-11

## 2019-09-11 VITALS
SYSTOLIC BLOOD PRESSURE: 104 MMHG | HEART RATE: 60 BPM | DIASTOLIC BLOOD PRESSURE: 72 MMHG | RESPIRATION RATE: 15 BRPM | OXYGEN SATURATION: 98 % | TEMPERATURE: 98 F

## 2019-09-11 VITALS
RESPIRATION RATE: 18 BRPM | DIASTOLIC BLOOD PRESSURE: 50 MMHG | WEIGHT: 171.96 LBS | HEIGHT: 61 IN | TEMPERATURE: 98 F | HEART RATE: 71 BPM | SYSTOLIC BLOOD PRESSURE: 124 MMHG | OXYGEN SATURATION: 97 %

## 2019-09-11 DIAGNOSIS — C50.912 MALIGNANT NEOPLASM OF UNSPECIFIED SITE OF LEFT FEMALE BREAST: ICD-10-CM

## 2019-09-11 DIAGNOSIS — Z90.49 ACQUIRED ABSENCE OF OTHER SPECIFIED PARTS OF DIGESTIVE TRACT: Chronic | ICD-10-CM

## 2019-09-11 DIAGNOSIS — Z98.891 HISTORY OF UTERINE SCAR FROM PREVIOUS SURGERY: Chronic | ICD-10-CM

## 2019-09-11 DIAGNOSIS — Z98.51 TUBAL LIGATION STATUS: Chronic | ICD-10-CM

## 2019-09-11 DIAGNOSIS — R00.2 PALPITATIONS: Chronic | ICD-10-CM

## 2019-09-11 DIAGNOSIS — Z00.00 ENCOUNTER FOR GENERAL ADULT MEDICAL EXAMINATION WITHOUT ABNORMAL FINDINGS: ICD-10-CM

## 2019-09-11 PROCEDURE — 76098 X-RAY EXAM SURGICAL SPECIMEN: CPT

## 2019-09-11 PROCEDURE — 76098 X-RAY EXAM SURGICAL SPECIMEN: CPT | Mod: 26

## 2019-09-11 PROCEDURE — 19301 PARTIAL MASTECTOMY: CPT | Mod: LT

## 2019-09-11 PROCEDURE — 88307 TISSUE EXAM BY PATHOLOGIST: CPT | Mod: 26

## 2019-09-11 PROCEDURE — 38525 BIOPSY/REMOVAL LYMPH NODES: CPT | Mod: LT

## 2019-09-11 PROCEDURE — 88305 TISSUE EXAM BY PATHOLOGIST: CPT | Mod: 26

## 2019-09-11 PROCEDURE — A9541: CPT

## 2019-09-11 RX ORDER — ATORVASTATIN CALCIUM 80 MG/1
1 TABLET, FILM COATED ORAL
Qty: 0 | Refills: 0 | DISCHARGE

## 2019-09-11 RX ORDER — FEXOFENADINE HCL 30 MG
1 TABLET ORAL
Qty: 0 | Refills: 0 | DISCHARGE

## 2019-09-11 RX ORDER — METOPROLOL TARTRATE 50 MG
0.5 TABLET ORAL
Qty: 0 | Refills: 0 | DISCHARGE

## 2019-09-11 RX ORDER — DILTIAZEM HCL 120 MG
1 CAPSULE, EXT RELEASE 24 HR ORAL
Qty: 0 | Refills: 0 | DISCHARGE

## 2019-09-11 RX ORDER — OXYCODONE HYDROCHLORIDE 5 MG/1
1 TABLET ORAL
Qty: 12 | Refills: 0
Start: 2019-09-11 | End: 2019-09-13

## 2019-09-11 RX ORDER — POTASSIUM CHLORIDE 20 MEQ
1 PACKET (EA) ORAL
Qty: 0 | Refills: 0 | DISCHARGE

## 2019-09-11 RX ORDER — ONDANSETRON 8 MG/1
4 TABLET, FILM COATED ORAL ONCE
Refills: 0 | Status: DISCONTINUED | OUTPATIENT
Start: 2019-09-11 | End: 2019-10-04

## 2019-09-11 RX ORDER — ALBUTEROL 90 UG/1
2 AEROSOL, METERED ORAL
Qty: 0 | Refills: 0 | DISCHARGE

## 2019-09-11 RX ORDER — PANTOPRAZOLE SODIUM 20 MG/1
1 TABLET, DELAYED RELEASE ORAL
Qty: 0 | Refills: 0 | DISCHARGE

## 2019-09-11 NOTE — BRIEF OPERATIVE NOTE - OPERATION/FINDINGS
Left breast mass, axillary lymph nodes stained with methylene blue. Sentinal lymph node biopsy performed first, followed up left breast lumpectomy, breast mass identified with daina  and excised

## 2019-09-11 NOTE — ASU DISCHARGE PLAN (ADULT/PEDIATRIC) - CALL YOUR DOCTOR IF YOU HAVE ANY OF THE FOLLOWING:
Wound/Surgical Site with redness, or foul smelling discharge or pus/Bleeding that does not stop/Swelling that gets worse Nausea and vomiting that does not stop/Inability to tolerate liquids or foods/Swelling that gets worse/Wound/Surgical Site with redness, or foul smelling discharge or pus/Pain not relieved by Medications/Fever greater than (need to indicate Fahrenheit or Celsius)/Bleeding that does not stop

## 2019-09-11 NOTE — ASU DISCHARGE PLAN (ADULT/PEDIATRIC) - MEDICATION INSTRUCTIONS
Take tylenol every 6 hours starting tomorrow for pain control, do not exceed 4,000mg a day, use ice pack to help with pain

## 2019-09-11 NOTE — ASU DISCHARGE PLAN (ADULT/PEDIATRIC) - PATIENT EDUCATION MATERIALS PROVIED
Pre-printed instructions given for other (specify)/information sheets given/Provider pre-printed instructions given

## 2019-09-11 NOTE — ASU DISCHARGE PLAN (ADULT/PEDIATRIC) - CARE PROVIDER_API CALL
Edd Reyes)  Surgery  [address] [place] [id number]  Phone: [phone]  Fax: [phone]  Follow Up Time: 1 week

## 2019-09-11 NOTE — BRIEF OPERATIVE NOTE - NSICDXBRIEFPROCEDURE_GEN_ALL_CORE_FT
PROCEDURES:  Biopsy, breast and sentinel lymph node 11-Sep-2019 10:38:44  Dwayne Hernandez  Left breast lumpectomy 11-Sep-2019 10:38:34  Dwayne Hernandez
pressure/burning/sharp/constant/stabbing

## 2019-09-11 NOTE — ASU DISCHARGE PLAN (ADULT/PEDIATRIC) - ASU DC SPECIAL INSTRUCTIONSFT
[facility]  CANCER  I  N  S  T  I  T  U  T E     Department of Surgery  Division of Surgical Oncology    Ed Frank M.D., [email or web address].  Chief, Division of Surgical Oncology    Faustino Chowdary M.D., F.A.C.S., F.A.S.BREEZY.  Associate , Department of Surgery    Faisal Hickman M.D., F.A.C.S.  Chandler Bright M.D., F.A.C.S.  Tyler Heath M.D., F.A.C.S.  King Tinsley M.D., F.A.C.S.  Chandler Thacker M.D., F.A.C.S.  Edd Reyes M.D., F.ROMARIOC.S.      Breast Biopsy/Lumpectomy Post-operative Instructions  1.	Supportive bra- Please bring a sports/athletic bra with you on the day of your surgery.  You will wear it home from the hospital.  You should wear the supportive bra continuously for 48 hours after the procedure, including wearing it to sleep.  Therefore, you may wear your regular bra.  You may remove the bra to shower.  The sports bra will provide support, decrease the amount of swelling at the biopsy site, and make your recovery more comfortable.    2.	Wound dressing- There is a dressing on the wound, which consists of 2 layers.  The outer layer is a clear plastic dressing (called Tegaderm) which is waterproof.  This should remain in place for 2 days post-operatively.  On the 2nd post-operative day, you should remove the clear plastic Tegaderm dressing.  Underneath the Tegaderm dressing, there are white surgical tapes (called steri strips) which are directly adherent to the skin.  These should remain on the skin, and will peel off naturally.  All of the stitches are “internal” and will dissolve naturally.    3.	Showering/Bathing- You may shower over the dressing the very next day after surgery.  Allow the water to run over the dressing, but don not scrub the area.  It is best not to sit in a bathtub or swimming pool for at least one week after surgery.    4.	Activity level- You may resume most normal daily activity as tolerated, but avoid strenuous activities such as aerobics, jogging, exercising or heavy lifting for at least 1 week after surgery.  You may return to work in 1-2 days after surgery.  You may drive as long as you are not taking any prescription pain medication.    5.	Pain Medication- You may take the prescribed medication, or you may take extra-strength Tylenol as needed.  Please don to take aspirin, Motrin, Advil or any other anti-inflammatory medications, as these medications may cause bleeding or bruising.    6.	Follow-up Appointment- Please call the office to schedule your post-operative appointment which should be approximately 10 days after your surgery. Office [phone]    7.	Bruising/Bleeding/Swelling- It is normal for there to be some bruising and swelling at the breast biopsy site, and there may be some staining of blood on to the dressing.  Some discomfort at the surgical site is expected.  If your symptoms seem excessive, or if you have any question or concerns, please call the office.      Anesthesia Precautions:  For the next 12 hours do not:   •	drive a car,  •	drink alcohol, beer, or wine,   •	make important personal or business decisions  Diet:   •	Progress diet slowly unless otherwise indicated    Physician Notification  •	Any Prescription issues  •	Bleeding that does not stop  •	Persistent nausea and vomiting  •	Pain not relieved by medications  •	Fever greater than 101®F  •	Inability to tolerate liquids or foods  •	Unable to urinate after 8 hours  Discharge and Disposition  •	Discharge to home/ group home/assisted living  •	Accompanied by Family/Spouse/ Parents/ Significant Other/ Friend/ and or Caregiver    Follow Up Care:  •	In the event that you develop a complication and you are unable to reach your own physician, you may contact:  [phone] or go to the nearest Emergency Room.   •	Please call your surgeon to schedule your follow up appointment [phone]

## 2019-09-13 LAB — SURGICAL PATHOLOGY STUDY: SIGNIFICANT CHANGE UP

## 2019-09-19 ENCOUNTER — APPOINTMENT (OUTPATIENT)
Dept: SURGICAL ONCOLOGY | Facility: CLINIC | Age: 69
End: 2019-09-19
Payer: MEDICARE

## 2019-09-19 VITALS
SYSTOLIC BLOOD PRESSURE: 134 MMHG | HEIGHT: 61 IN | WEIGHT: 168 LBS | HEART RATE: 71 BPM | DIASTOLIC BLOOD PRESSURE: 89 MMHG | BODY MASS INDEX: 31.72 KG/M2 | OXYGEN SATURATION: 94 % | RESPIRATION RATE: 16 BRPM

## 2019-09-19 PROCEDURE — 99024 POSTOP FOLLOW-UP VISIT: CPT

## 2019-09-19 NOTE — ASSESSMENT
[FreeTextEntry1] : Imp:\par s/p left breast lumpectomy and left axillary sentinel  lymph node biopsy on 9/11/19.  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  0/5 LNs and negative margins.  Tumor stage: T1N0\par \par Plan:\par RTO in 6 months\par Referral to med/onc Dr. Tate

## 2019-09-19 NOTE — PHYSICAL EXAM
[Normal] : well developed, well nourished, in no acute distress [FreeTextEntry1] : RC present for exam.  [de-identified] : Left breast and left axillary incision healing well without signs of infection or seroma.  Healing pre surgical biopsy site.  Left breast bruising.

## 2019-09-19 NOTE — REASON FOR VISIT
[Post-Op] : a post-op for [FreeTextEntry2] : s/p left breast lumpectomy and left axillary sentinel  lymph node biopsy on 9/11/19.

## 2019-09-20 ENCOUNTER — OTHER (OUTPATIENT)
Age: 69
End: 2019-09-20

## 2019-10-03 ENCOUNTER — OUTPATIENT (OUTPATIENT)
Dept: OUTPATIENT SERVICES | Facility: HOSPITAL | Age: 69
LOS: 1 days | Discharge: HOME | End: 2019-10-03

## 2019-10-03 DIAGNOSIS — Z90.49 ACQUIRED ABSENCE OF OTHER SPECIFIED PARTS OF DIGESTIVE TRACT: Chronic | ICD-10-CM

## 2019-10-03 DIAGNOSIS — Z98.51 TUBAL LIGATION STATUS: Chronic | ICD-10-CM

## 2019-10-03 DIAGNOSIS — C50.919 MALIGNANT NEOPLASM OF UNSPECIFIED SITE OF UNSPECIFIED FEMALE BREAST: ICD-10-CM

## 2019-10-03 DIAGNOSIS — R00.2 PALPITATIONS: Chronic | ICD-10-CM

## 2019-10-03 DIAGNOSIS — Z98.891 HISTORY OF UTERINE SCAR FROM PREVIOUS SURGERY: Chronic | ICD-10-CM

## 2019-10-08 ENCOUNTER — APPOINTMENT (OUTPATIENT)
Dept: HEMATOLOGY ONCOLOGY | Facility: CLINIC | Age: 69
End: 2019-10-08
Payer: MEDICARE

## 2019-10-08 VITALS
DIASTOLIC BLOOD PRESSURE: 84 MMHG | HEART RATE: 75 BPM | OXYGEN SATURATION: 96 % | TEMPERATURE: 98 F | HEIGHT: 61.02 IN | BODY MASS INDEX: 32.63 KG/M2 | WEIGHT: 172.84 LBS | RESPIRATION RATE: 16 BRPM | SYSTOLIC BLOOD PRESSURE: 125 MMHG

## 2019-10-08 DIAGNOSIS — Z87.59 PERSONAL HISTORY OF OTHER COMPLICATIONS OF PREGNANCY, CHILDBIRTH AND THE PUERPERIUM: ICD-10-CM

## 2019-10-08 DIAGNOSIS — L71.9 ROSACEA, UNSPECIFIED: ICD-10-CM

## 2019-10-08 DIAGNOSIS — I45.10 UNSPECIFIED RIGHT BUNDLE-BRANCH BLOCK: ICD-10-CM

## 2019-10-08 DIAGNOSIS — Z86.19 PERSONAL HISTORY OF OTHER INFECTIOUS AND PARASITIC DISEASES: ICD-10-CM

## 2019-10-08 DIAGNOSIS — I10 ESSENTIAL (PRIMARY) HYPERTENSION: ICD-10-CM

## 2019-10-08 DIAGNOSIS — Z80.3 FAMILY HISTORY OF MALIGNANT NEOPLASM OF BREAST: ICD-10-CM

## 2019-10-08 DIAGNOSIS — Z80.0 FAMILY HISTORY OF MALIGNANT NEOPLASM OF DIGESTIVE ORGANS: ICD-10-CM

## 2019-10-08 DIAGNOSIS — K58.9 IRRITABLE BOWEL SYNDROME W/OUT DIARRHEA: ICD-10-CM

## 2019-10-08 DIAGNOSIS — Z87.891 PERSONAL HISTORY OF NICOTINE DEPENDENCE: ICD-10-CM

## 2019-10-08 PROCEDURE — 99205 OFFICE O/P NEW HI 60 MIN: CPT

## 2019-10-08 RX ORDER — MULTIVITAMIN
TABLET ORAL DAILY
Refills: 0 | Status: ACTIVE | COMMUNITY
Start: 2019-10-08

## 2019-10-08 RX ORDER — METRONIDAZOLE 10 MG/G
1 GEL TOPICAL
Qty: 60 | Refills: 0 | Status: ACTIVE | COMMUNITY
Start: 2019-05-21

## 2019-10-08 RX ORDER — CALCIUM CARBONATE/VITAMIN D3 500-10/5ML
400 LIQUID (ML) ORAL
Refills: 0 | Status: ACTIVE | COMMUNITY
Start: 2019-10-08

## 2019-10-08 RX ORDER — POTASSIUM CHLORIDE 750 MG/1
10 TABLET, FILM COATED, EXTENDED RELEASE ORAL
Qty: 90 | Refills: 0 | Status: ACTIVE | COMMUNITY
Start: 2019-09-25

## 2019-10-08 RX ORDER — ESTRADIOL 0.1 MG/G
0.1 CREAM VAGINAL
Qty: 42 | Refills: 0 | Status: DISCONTINUED | COMMUNITY
Start: 2019-08-06 | End: 2019-10-08

## 2019-10-08 RX ORDER — ASPIRIN/ACETAMINOPHEN/CAFFEINE 250-250-65
250-250-65 TABLET ORAL 3 TIMES DAILY
Refills: 0 | Status: ACTIVE | COMMUNITY
Start: 2019-10-08

## 2019-10-08 RX ORDER — PANTOPRAZOLE 40 MG/1
40 TABLET, DELAYED RELEASE ORAL
Qty: 90 | Refills: 0 | Status: ACTIVE | COMMUNITY
Start: 2019-07-31

## 2019-10-08 RX ORDER — DILTIAZEM HYDROCHLORIDE 120 MG/1
120 CAPSULE, COATED, EXTENDED RELEASE ORAL DAILY
Refills: 0 | Status: ACTIVE | COMMUNITY
Start: 2019-10-08

## 2019-10-08 RX ORDER — METOPROLOL TARTRATE 25 MG/1
25 TABLET, FILM COATED ORAL
Qty: 90 | Refills: 0 | Status: ACTIVE | COMMUNITY
Start: 2019-06-18

## 2019-10-08 RX ORDER — FEXOFENADINE HYDROCHLORIDE 180 MG/1
180 TABLET, FILM COATED ORAL DAILY
Refills: 0 | Status: ACTIVE | COMMUNITY
Start: 2019-10-08

## 2019-10-08 NOTE — HISTORY OF PRESENT ILLNESS
[T: ___] : T[unfilled] [N: ___] : N[unfilled] [M: ___] : M[unfilled] [Disease: _____________________] : Disease: [unfilled] [AJCC Stage: ____] : AJCC Stage: [unfilled] [de-identified] : Left breast cancer diagnosed at the age of 69\par 07/29/19 Screening mammogram showed a left breast neodensity measuring 4 to 5 mm in the left central upper inner quadrant. \par 08/05/19 Left breast mammogram  and ultrasound showed persistent nodule in the left upper inner quadrant corresponding with a 4 mm irregular hypoechoic nodule in the 10 o'clock axis on corresponding ultrasound.\par 08/12/19 Left breast 10 o'clock axis 9 cm FN sonoguided core biopsy showed a ~ 0.5 cm IDC with micropapillary features, SBR 8/9, ER 98%, [place] 0%, Ki-67 28% and HER-2 equivocal but FISH negative\par 08/26/19 MRI of the breast showed a susceptibility artifact from a biopsy marker at the left 10 o'clock axis, indicating the site of biopsy proven malignancy. A post biopsy hematoma noted measuring 2.3 cm. 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 Distance of nearest margin anterior 0.6 cm. Also noted was a 0.1 cm single focus of ductal carcinoma in situ, high nuclear grade with necrosis with distance of nearest margin 1.0 cm anteriorly\par  [de-identified] : 0.5 cm Invasive ductal carcinoma with micropapillary features, SBR 8/9, ER 98%, [place] 0%, Ki-67 28%, HER-2 equivocal FISH negative, 0/5 SLN, Distance of nearest margin anterior 0.6 cm [de-identified] : Vandana comes to discuss the medical management of her breast cancer. She has recovered well from her surgery and is generally doing well. She has a number of medical issues including hypertension, hyperlipidemia, RBBB and IBS, but in general she feels well and is active. She has retired and now babysits her grandchildren.\par \par HEALTH MAINTENANCE:\par Mammogram: 07/29/19\par Pap smear: 2018 normal\par Bone density DEXA scan: 2018 showed osteoporosis and started Prolia 6/18\par Colonoscopy: 2013, no polyps\par EGD: 2013\par \par

## 2019-10-08 NOTE — REASON FOR VISIT
[Initial Consultation] : an initial consultation [Spouse] : spouse [FreeTextEntry2] : ER positive [place] negative, HER-2 negative Breast Cancer

## 2019-10-08 NOTE — CONSULT LETTER
[Dear  ___] : Dear  [unfilled], [( Thank you for referring [unfilled] for consultation for _____ )] : Thank you for referring [unfilled] for consultation for [unfilled] [Please see my note below.] : Please see my note below. [Consult Closing:] : Thank you very much for allowing me to participate in the care of this patient.  If you have any questions, please do not hesitate to contact me. [Sincerely,] : Sincerely, [DrDipak  ___] : Dr. MOE [DrDipak ___] : Dr. MOE [FreeTextEntry2] : Edd Reyes MD\[address]\[place] [address] [FreeTextEntry3] : Kamilah Tate MD\par Associate Chief \par [facility]\par [facility] [place]\par  of Medicine\par [employer] [facility] [employer]\par \par

## 2019-10-08 NOTE — PHYSICAL EXAM
[Fully active, able to carry on all pre-disease performance without restriction] : Status 0 - Fully active, able to carry on all pre-disease performance without restriction [Normal] : affect appropriate [de-identified] : Well healed incisions

## 2019-12-02 ENCOUNTER — RX RENEWAL (OUTPATIENT)
Age: 69
End: 2019-12-02

## 2020-01-01 NOTE — ASU DISCHARGE PLAN (ADULT/PEDIATRIC) - SIGNS AND SYMPTOMS OF INFECTION: FEVER, REDNESS, SWELLING, FOUL SMELLING DISCHARGE
Statement Selected
1. I was told the name of the doctor(s) who took care of my child while in the hospital.    2. I have been told about any important findings on my child's plan of care.    3. The doctor clearly explained my child's diagnosis and other possible diagnoses that were considered.    4. My child's doctor explained all the tests that were done and their results (if available). I understand that some of the test results may not be ready before we go home and I was told how I can get these results. I understand that a summary of my child's hospitalization and important test results will be shared with my child's outpatient doctor.    5. My child's doctor talked to me about what I need to do when we go home.    6. I understand what signs and symptoms to watch for. I understand what symptoms I would need to call my doctor for and/or return to the hospital.    7. I have the phone number to call the hospital for results and/or questions after I leave the hospital.

## 2020-03-14 ENCOUNTER — OUTPATIENT (OUTPATIENT)
Dept: OUTPATIENT SERVICES | Facility: HOSPITAL | Age: 70
LOS: 1 days | Discharge: HOME | End: 2020-03-14

## 2020-03-14 DIAGNOSIS — Z90.49 ACQUIRED ABSENCE OF OTHER SPECIFIED PARTS OF DIGESTIVE TRACT: Chronic | ICD-10-CM

## 2020-03-14 DIAGNOSIS — Z98.51 TUBAL LIGATION STATUS: Chronic | ICD-10-CM

## 2020-03-14 DIAGNOSIS — C50.919 MALIGNANT NEOPLASM OF UNSPECIFIED SITE OF UNSPECIFIED FEMALE BREAST: ICD-10-CM

## 2020-03-14 DIAGNOSIS — Z98.891 HISTORY OF UTERINE SCAR FROM PREVIOUS SURGERY: Chronic | ICD-10-CM

## 2020-03-14 DIAGNOSIS — R00.2 PALPITATIONS: Chronic | ICD-10-CM

## 2020-03-19 ENCOUNTER — APPOINTMENT (OUTPATIENT)
Dept: HEMATOLOGY ONCOLOGY | Facility: CLINIC | Age: 70
End: 2020-03-19

## 2020-04-30 ENCOUNTER — OUTPATIENT (OUTPATIENT)
Dept: OUTPATIENT SERVICES | Facility: HOSPITAL | Age: 70
LOS: 1 days | Discharge: HOME | End: 2020-04-30

## 2020-04-30 DIAGNOSIS — Z98.891 HISTORY OF UTERINE SCAR FROM PREVIOUS SURGERY: Chronic | ICD-10-CM

## 2020-04-30 DIAGNOSIS — R00.2 PALPITATIONS: Chronic | ICD-10-CM

## 2020-04-30 DIAGNOSIS — Z90.49 ACQUIRED ABSENCE OF OTHER SPECIFIED PARTS OF DIGESTIVE TRACT: Chronic | ICD-10-CM

## 2020-04-30 DIAGNOSIS — Z98.51 TUBAL LIGATION STATUS: Chronic | ICD-10-CM

## 2020-04-30 DIAGNOSIS — C50.919 MALIGNANT NEOPLASM OF UNSPECIFIED SITE OF UNSPECIFIED FEMALE BREAST: ICD-10-CM

## 2020-05-04 NOTE — OB HISTORY
[Menopause Age: ____] : age at menopause was [unfilled] [Menarche Age: ____] : age at menarche was [unfilled] [___] : Full Term: [unfilled]

## 2020-05-05 ENCOUNTER — APPOINTMENT (OUTPATIENT)
Dept: HEMATOLOGY ONCOLOGY | Facility: CLINIC | Age: 70
End: 2020-05-05
Payer: MEDICARE

## 2020-05-05 PROCEDURE — 99214 OFFICE O/P EST MOD 30 MIN: CPT | Mod: 95

## 2020-05-05 NOTE — REASON FOR VISIT
[Spouse] : spouse [Follow-Up Visit] : a follow-up [FreeTextEntry2] : ER positive, [place] negative, HER-2 negative Breast Cancer

## 2020-05-05 NOTE — HISTORY OF PRESENT ILLNESS
[Home] : at home, [unfilled] , at the time of the visit. [Medical Office: ([facility])___] : at the medical office located in  [Patient] : the patient [Self] : self [Disease: _____________________] : Disease: [unfilled] [T: ___] : T[unfilled] [N: ___] : N[unfilled] [M: ___] : M[unfilled] [AJCC Stage: ____] : AJCC Stage: [unfilled] [FreeTextEntry2] : Vandana Mora [de-identified] : Left breast cancer diagnosed at the age of 69\par 07/29/19 Screening mammogram showed a left breast neodensity measuring 4 to 5 mm in the left central upper inner quadrant. \par 08/05/19 Left breast mammogram  and ultrasound showed persistent nodule in the left upper inner quadrant corresponding with a 4 mm irregular hypoechoic nodule in the 10 o'clock axis on corresponding ultrasound.\par 08/12/19 Left breast 10 o'clock axis 9 cm FN sonoguided core biopsy showed a ~ 0.5 cm IDC with micropapillary features, SBR 8/9, ER 98%, [place] 0%, Ki-67 28% and HER-2 equivocal but FISH negative\par 08/26/19 MRI of the breast showed a susceptibility artifact from a biopsy marker at the left 10 o'clock axis, indicating the site of biopsy proven malignancy. A post biopsy hematoma noted measuring 2.3 cm. 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 Distance of nearest margin anterior 0.6 cm. 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 Griffin.\par 12/2019 Anastrozole started [de-identified] : 0.5 cm Invasive ductal carcinoma with micropapillary features, SBR 8/9, ER 98%, [place] 0%, Ki-67 28%, HER-2 equivocal FISH negative, 0/5 SLN, Distance of nearest margin anterior 0.6 cm [de-identified] : Vandana is seen today for a virtual follow up visit, in light of the COVID-19 pandemic crisis.\par She is s/p RT to the left breast with Dr. Griffin which she completed on 11/22/2019.\par She started anastrozole in early 12/2019 and has been tolerating it fairly well aside from mild joint pain and stiffness in the hands. She also mentions dropping things at time, but thinks that the discomfort is manageable at this time.She has h/o trigger finger which she received injections for with some relief in the past. \par She denies any hot flashes or vaginal dryness.\par She is overall doing well and trying to protect herself by staying at home as much as possible during the pandemic crisis as she has a number of medical issues including hypertension, hyperlipidemia, RBBB and IBS. She was on an airplane flight sometime in 03/20 and did feel flu like symptoms minus the fever, so believes she may have had the virus, so her PCP tested her for the antibodies yesterday .\par She cannot wait until this is all over so she can once again spend sometime with her grandchildren, whom she was babysitting prior to the pandemic.\par \par HEALTH MAINTENANCE:\par Mammogram: 07/29/19\par Pap smear: 2018 normal\par Bone density DEXA scan: 2018 showed osteoporosis and started Prolia 6/18\par Colonoscopy: 2013, no polyps\par EGD: 2013\par Genetic testing: Never done, + family h/o breast cancer, mother diagnosed in her late 50's, discussed getting [facility] done at her next visit in 09/20\par \par

## 2020-06-24 NOTE — HISTORY OF PRESENT ILLNESS
[de-identified] : Vandana is a 68 y/o female patient presents for an initial post-op evaluation.  She is s/p left breast lumpectomy and left axillary sentinel  lymph node biopsy on 19.  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  0/5 LNs and negative margins.  Tumor stage: T1N0\par \par Today she feels generally well with minor left breast discomfort.  Denies post op fevers.\par \par Pertinent history:\par Pathology 19: LT breast 10:00 9cmfn: Moderately differentiated invasive ductal carcinoma with micropapillary growth pattern measuring 0.5 cm in maximal length in this material. \par \par FMHx of Breast CA from mother at 60 y/o. Mother is . \par Pt has a hx of HTN, asthma, RT bundle branch block catheter negative dx on 2019, bronchitis, Hep A in , GERD, IBS. In addition to Eclampsia in , Vasovagal, and skin CA. \par \par \par Menarche: 10 y/o\par LMP: \par \par Age at first pregnancy: 22 y/o\par NKDA
[FreeTextEntry1] : She is feeling well following her recent presentation with an acute coronary syndrome. She will continue all of her medications.\par \par She will have blood work done in about 6 weeks, upon her next visit.\par \par Her blood pressure is elevated, but she is anxious. She will begin checking her blood pressure twice daily at home, and we will readdress this when we have a better assessment of her true blood pressure.

## 2020-08-26 ENCOUNTER — RX RENEWAL (OUTPATIENT)
Age: 70
End: 2020-08-26

## 2020-08-27 ENCOUNTER — RESULT REVIEW (OUTPATIENT)
Age: 70
End: 2020-08-27

## 2020-08-27 ENCOUNTER — APPOINTMENT (OUTPATIENT)
Dept: MAMMOGRAPHY | Facility: CLINIC | Age: 70
End: 2020-08-27
Payer: MEDICARE

## 2020-08-27 ENCOUNTER — APPOINTMENT (OUTPATIENT)
Dept: ULTRASOUND IMAGING | Facility: CLINIC | Age: 70
End: 2020-08-27
Payer: MEDICARE

## 2020-08-27 ENCOUNTER — APPOINTMENT (OUTPATIENT)
Dept: RADIOLOGY | Facility: CLINIC | Age: 70
End: 2020-08-27
Payer: MEDICARE

## 2020-08-27 ENCOUNTER — OUTPATIENT (OUTPATIENT)
Dept: OUTPATIENT SERVICES | Facility: HOSPITAL | Age: 70
LOS: 1 days | End: 2020-08-27
Payer: MEDICARE

## 2020-08-27 DIAGNOSIS — C50.912 MALIGNANT NEOPLASM OF UNSPECIFIED SITE OF LEFT FEMALE BREAST: ICD-10-CM

## 2020-08-27 DIAGNOSIS — Z98.51 TUBAL LIGATION STATUS: Chronic | ICD-10-CM

## 2020-08-27 DIAGNOSIS — R00.2 PALPITATIONS: Chronic | ICD-10-CM

## 2020-08-27 DIAGNOSIS — Z90.49 ACQUIRED ABSENCE OF OTHER SPECIFIED PARTS OF DIGESTIVE TRACT: Chronic | ICD-10-CM

## 2020-08-27 DIAGNOSIS — Z98.891 HISTORY OF UTERINE SCAR FROM PREVIOUS SURGERY: Chronic | ICD-10-CM

## 2020-08-27 PROCEDURE — 77066 DX MAMMO INCL CAD BI: CPT | Mod: 26

## 2020-08-27 PROCEDURE — 76641 ULTRASOUND BREAST COMPLETE: CPT | Mod: 26,50

## 2020-08-27 PROCEDURE — G0279: CPT | Mod: 26

## 2020-08-27 PROCEDURE — G0279: CPT

## 2020-08-27 PROCEDURE — 77066 DX MAMMO INCL CAD BI: CPT

## 2020-08-27 PROCEDURE — 76641 ULTRASOUND BREAST COMPLETE: CPT

## 2020-08-29 ENCOUNTER — OUTPATIENT (OUTPATIENT)
Dept: OUTPATIENT SERVICES | Facility: HOSPITAL | Age: 70
LOS: 1 days | Discharge: HOME | End: 2020-08-29

## 2020-08-29 DIAGNOSIS — C50.919 MALIGNANT NEOPLASM OF UNSPECIFIED SITE OF UNSPECIFIED FEMALE BREAST: ICD-10-CM

## 2020-08-29 DIAGNOSIS — Z98.51 TUBAL LIGATION STATUS: Chronic | ICD-10-CM

## 2020-08-29 DIAGNOSIS — Z98.891 HISTORY OF UTERINE SCAR FROM PREVIOUS SURGERY: Chronic | ICD-10-CM

## 2020-08-29 DIAGNOSIS — Z90.49 ACQUIRED ABSENCE OF OTHER SPECIFIED PARTS OF DIGESTIVE TRACT: Chronic | ICD-10-CM

## 2020-08-29 DIAGNOSIS — R00.2 PALPITATIONS: Chronic | ICD-10-CM

## 2020-09-03 ENCOUNTER — RESULT REVIEW (OUTPATIENT)
Age: 70
End: 2020-09-03

## 2020-09-03 ENCOUNTER — APPOINTMENT (OUTPATIENT)
Dept: HEMATOLOGY ONCOLOGY | Facility: CLINIC | Age: 70
End: 2020-09-03
Payer: MEDICARE

## 2020-09-03 VITALS
WEIGHT: 176.15 LBS | HEIGHT: 61.3 IN | TEMPERATURE: 97.6 F | BODY MASS INDEX: 32.83 KG/M2 | HEART RATE: 62 BPM | RESPIRATION RATE: 15 BRPM | DIASTOLIC BLOOD PRESSURE: 80 MMHG | SYSTOLIC BLOOD PRESSURE: 160 MMHG | OXYGEN SATURATION: 94 %

## 2020-09-03 LAB
BASOPHILS # BLD AUTO: 0.05 K/UL — SIGNIFICANT CHANGE UP (ref 0–0.2)
BASOPHILS NFR BLD AUTO: 0.6 % — SIGNIFICANT CHANGE UP (ref 0–2)
EOSINOPHIL # BLD AUTO: 0.41 K/UL — SIGNIFICANT CHANGE UP (ref 0–0.5)
EOSINOPHIL NFR BLD AUTO: 5 % — SIGNIFICANT CHANGE UP (ref 0–6)
HCT VFR BLD CALC: 42.5 % — SIGNIFICANT CHANGE UP (ref 34.5–45)
HGB BLD-MCNC: 13.8 G/DL — SIGNIFICANT CHANGE UP (ref 11.5–15.5)
IMM GRANULOCYTES NFR BLD AUTO: 0.6 % — SIGNIFICANT CHANGE UP (ref 0–1.5)
LYMPHOCYTES # BLD AUTO: 1.65 K/UL — SIGNIFICANT CHANGE UP (ref 1–3.3)
LYMPHOCYTES # BLD AUTO: 20.3 % — SIGNIFICANT CHANGE UP (ref 13–44)
MCHC RBC-ENTMCNC: 29.6 PG — SIGNIFICANT CHANGE UP (ref 27–34)
MCHC RBC-ENTMCNC: 32.5 GM/DL — SIGNIFICANT CHANGE UP (ref 32–36)
MCV RBC AUTO: 91 FL — SIGNIFICANT CHANGE UP (ref 80–100)
MONOCYTES # BLD AUTO: 0.64 K/UL — SIGNIFICANT CHANGE UP (ref 0–0.9)
MONOCYTES NFR BLD AUTO: 7.9 % — SIGNIFICANT CHANGE UP (ref 2–14)
NEUTROPHILS # BLD AUTO: 5.34 K/UL — SIGNIFICANT CHANGE UP (ref 1.8–7.4)
NEUTROPHILS NFR BLD AUTO: 65.6 % — SIGNIFICANT CHANGE UP (ref 43–77)
NRBC # BLD: 0 /100 WBCS — SIGNIFICANT CHANGE UP (ref 0–0)
PLATELET # BLD AUTO: 293 K/UL — SIGNIFICANT CHANGE UP (ref 150–400)
RBC # BLD: 4.67 M/UL — SIGNIFICANT CHANGE UP (ref 3.8–5.2)
RBC # FLD: 12.9 % — SIGNIFICANT CHANGE UP (ref 10.3–14.5)
WBC # BLD: 8.14 K/UL — SIGNIFICANT CHANGE UP (ref 3.8–10.5)
WBC # FLD AUTO: 8.14 K/UL — SIGNIFICANT CHANGE UP (ref 3.8–10.5)

## 2020-09-03 PROCEDURE — 99215 OFFICE O/P EST HI 40 MIN: CPT

## 2020-09-03 RX ORDER — ANASTROZOLE TABLETS 1 MG/1
1 TABLET ORAL
Qty: 90 | Refills: 1 | Status: DISCONTINUED | COMMUNITY
Start: 2019-12-02 | End: 2020-09-03

## 2020-09-06 NOTE — HISTORY OF PRESENT ILLNESS
[Disease: _____________________] : Disease: [unfilled] [T: ___] : T[unfilled] [N: ___] : N[unfilled] [M: ___] : M[unfilled] [AJCC Stage: ____] : AJCC Stage: [unfilled] [Home] : at home, [unfilled] , at the time of the visit. [Medical Office: ([facility])___] : at the medical office located in  [Patient] : the patient [Self] : self [FreeTextEntry2] : Vandana Mora [de-identified] : Left breast cancer diagnosed at the age of 69\par 07/29/19 Screening mammogram showed a left breast neodensity measuring 4 to 5 mm in the left central upper inner quadrant. \par 08/05/19 Left breast mammogram  and ultrasound showed persistent nodule in the left upper inner quadrant corresponding with a 4 mm irregular hypoechoic nodule in the 10 o'clock axis on corresponding ultrasound.\par 08/12/19 Left breast 10 o'clock axis 9 cm FN sonoguided core biopsy showed a ~ 0.5 cm IDC with micropapillary features, SBR 8/9, ER 98%, [place] 0%, Ki-67 28% and HER-2 equivocal but FISH negative\par 08/26/19 MRI of the breast showed a susceptibility artifact from a biopsy marker at the left 10 o'clock axis, indicating the site of biopsy proven malignancy. A post biopsy hematoma noted measuring 2.3 cm. 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 Distance of nearest margin anterior 0.6 cm. 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 Griffin.\par 12/2019 Anastrozole started, changed to letrozole in 9/20 due to arthraligias [de-identified] : 0.5 cm Invasive ductal carcinoma with micropapillary features, SBR 8/9, ER 98%, [place] 0%, Ki-67 28%, HER-2 equivocal FISH negative, 0/5 SLN, Distance of nearest margin anterior 0.6 cm [de-identified] : Vandana started anastrozole in early 12/2019 and continues to tolerate it fairly well aside from mild joint pain and stiffness in the hands. She has had some increase in joint pain in her fingers and the pain in her hands has been waking her up at night. She has developed trigger finger  in her left hand. She also has back pain with worsening sciatica and spinal stenosis and is seeing a new pain specialist next week. She is on Prolia for the osteoporosis which she thinks helps with her back pain.\par She has also developed sweats with any exertion, which are annoying.  She denies vaginal dryness.\par \par HEALTH MAINTENANCE:\par Mammogram: 07/29/19\par Pap smear: 2018 normal\par Bone density DEXA scan: 2018 showed osteoporosis and started Prolia 6/18; next due in 11/20\par Colonoscopy: 2013, no polyps\par EGD: 2013\par Genetic testing: Never done, and has family h/o breast cancer, mother diagnosed in her late 50's, will do [employer] today\par \par

## 2020-09-06 NOTE — REASON FOR VISIT
[Follow-Up Visit] : a follow-up [Spouse] : spouse [FreeTextEntry2] : ER positive, [place] negative, HER-2 negative Breast Cancer

## 2020-09-08 LAB
25(OH)D3 SERPL-MCNC: 42.9 NG/ML
ALBUMIN SERPL ELPH-MCNC: 4.4 G/DL
ALP BLD-CCNC: 87 U/L
ALT SERPL-CCNC: 23 U/L
ANION GAP SERPL CALC-SCNC: 14 MMOL/L
AST SERPL-CCNC: 22 U/L
BILIRUB SERPL-MCNC: 0.6 MG/DL
BUN SERPL-MCNC: 14 MG/DL
CALCIUM SERPL-MCNC: 9.2 MG/DL
CHLORIDE SERPL-SCNC: 99 MMOL/L
CHOLEST SERPL-MCNC: 144 MG/DL
CHOLEST/HDLC SERPL: 2 RATIO
CO2 SERPL-SCNC: 24 MMOL/L
CREAT SERPL-MCNC: 0.86 MG/DL
ESTIMATED AVERAGE GLUCOSE: 131 MG/DL
GLUCOSE SERPL-MCNC: 110 MG/DL
HBA1C MFR BLD HPLC: 6.2 %
HDLC SERPL-MCNC: 73 MG/DL
LDLC SERPL CALC-MCNC: 54 MG/DL
POTASSIUM SERPL-SCNC: 4.1 MMOL/L
PROT SERPL-MCNC: 6.9 G/DL
SODIUM SERPL-SCNC: 137 MMOL/L
TRIGL SERPL-MCNC: 83 MG/DL
TSH SERPL-ACNC: 0.58 UIU/ML

## 2020-09-21 ENCOUNTER — APPOINTMENT (OUTPATIENT)
Dept: SURGICAL ONCOLOGY | Facility: CLINIC | Age: 70
End: 2020-09-21
Payer: MEDICARE

## 2020-09-21 VITALS
RESPIRATION RATE: 15 BRPM | HEIGHT: 61 IN | SYSTOLIC BLOOD PRESSURE: 144 MMHG | OXYGEN SATURATION: 95 % | BODY MASS INDEX: 32.1 KG/M2 | HEART RATE: 77 BPM | DIASTOLIC BLOOD PRESSURE: 83 MMHG | WEIGHT: 170 LBS

## 2020-09-21 PROCEDURE — 99214 OFFICE O/P EST MOD 30 MIN: CPT

## 2020-09-21 NOTE — PHYSICAL EXAM
[Normal] : well developed, well nourished, in no acute distress [FreeTextEntry1] : ITodd was present for the physical exam.  [de-identified] : Normal S1, S2. Regular rate and rhythm.  [de-identified] : Left breast contracted secondary to radiation therapy.  [de-identified] : Clear breath sounds bilaterally, normal respiratory effort.

## 2020-09-21 NOTE — ASSESSMENT
[FreeTextEntry1] : IMP\par No evidence of new or recurrent lesions. Breast imaging failed to identify any suspicious lesions. No suspicious lesions on exam.\par \par PLAN\par Continue yearly surveillance

## 2020-09-21 NOTE — HISTORY OF PRESENT ILLNESS
[de-identified] : Vandana is a 70 y/o female patient presents for a follow up. .  She is s/p left breast lumpectomy and left axillary sentinel  lymph node biopsy on 19.  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  0/5 LNs and negative margins.  Tumor stage: T1N0\par \par Today she feels well. No new complaints. \par Pt notes she was on Arimidex but c/o of hot flashes and worsening joint pain. Was switched to Letrazole as per Dr. Arboleda. \par Denies palpable breast masses, nipple discharge, skin changes, inversion of breast pain. Denies constitutional symptoms\par  \par \par Pertinent history:\par Pathology 19: LT breast 10:00 9cmfn: Moderately differentiated invasive ductal carcinoma with micropapillary growth pattern measuring 0.5 cm in maximal length in this material. \par \par FMHx of Breast CA from mother at 58 y/o. Mother is . \par Pt has a hx of HTN, asthma, RT bundle branch block catheter negative dx on 2019, bronchitis, Hep A in , GERD, IBS. In addition to Eclampsia in , Vasovagal, and skin CA. \par \par \par Menarche: 10 y/o\par LMP: \par \par Age at first pregnancy: 24 y/o\par NKDA

## 2020-09-21 NOTE — ADDENDUM
[FreeTextEntry1] : I, Todd Machado, acted soley as a scribe for Dr. Edd Reyes on this date 09/21/2020.

## 2020-10-07 LAB
MISCELLANEOUS TEST: NORMAL
PROC NAME: NORMAL

## 2020-10-11 ENCOUNTER — TRANSCRIPTION ENCOUNTER (OUTPATIENT)
Age: 70
End: 2020-10-11

## 2021-01-28 ENCOUNTER — RX RENEWAL (OUTPATIENT)
Age: 71
End: 2021-01-28

## 2021-02-26 ENCOUNTER — OUTPATIENT (OUTPATIENT)
Dept: OUTPATIENT SERVICES | Facility: HOSPITAL | Age: 71
LOS: 1 days | Discharge: HOME | End: 2021-02-26

## 2021-02-26 DIAGNOSIS — R00.2 PALPITATIONS: Chronic | ICD-10-CM

## 2021-02-26 DIAGNOSIS — C50.919 MALIGNANT NEOPLASM OF UNSPECIFIED SITE OF UNSPECIFIED FEMALE BREAST: ICD-10-CM

## 2021-02-26 DIAGNOSIS — Z90.49 ACQUIRED ABSENCE OF OTHER SPECIFIED PARTS OF DIGESTIVE TRACT: Chronic | ICD-10-CM

## 2021-02-26 DIAGNOSIS — Z98.51 TUBAL LIGATION STATUS: Chronic | ICD-10-CM

## 2021-02-26 DIAGNOSIS — Z98.891 HISTORY OF UTERINE SCAR FROM PREVIOUS SURGERY: Chronic | ICD-10-CM

## 2021-03-02 ENCOUNTER — APPOINTMENT (OUTPATIENT)
Dept: HEMATOLOGY ONCOLOGY | Facility: CLINIC | Age: 71
End: 2021-03-02
Payer: MEDICARE

## 2021-03-02 PROCEDURE — 99214 OFFICE O/P EST MOD 30 MIN: CPT | Mod: 95

## 2021-03-02 RX ORDER — GLUCOSAMINE HCL/CHONDROITIN SU 500-400 MG
500-400 CAPSULE ORAL
Refills: 0 | Status: ACTIVE | COMMUNITY
Start: 2021-03-02

## 2021-03-02 RX ORDER — DENOSUMAB 60 MG/ML
60 INJECTION SUBCUTANEOUS
Qty: 1 | Refills: 0 | Status: ACTIVE | COMMUNITY
Start: 2021-03-02

## 2021-03-02 RX ORDER — TURMERIC 400 MG
400 CAPSULE ORAL
Refills: 0 | Status: ACTIVE | COMMUNITY
Start: 2021-03-02

## 2021-03-02 NOTE — HISTORY OF PRESENT ILLNESS
[Disease: _____________________] : Disease: [unfilled] [T: ___] : T[unfilled] [N: ___] : N[unfilled] [M: ___] : M[unfilled] [AJCC Stage: ____] : AJCC Stage: [unfilled] [de-identified] : Left breast cancer diagnosed at the age of 69\par 07/29/19 Screening mammogram showed a left breast neodensity measuring 4 to 5 mm in the left central upper inner quadrant. \par 08/05/19 Left breast mammogram  and ultrasound showed persistent nodule in the left upper inner quadrant corresponding with a 4 mm irregular hypoechoic nodule in the 10 o'clock axis on corresponding ultrasound.\par 08/12/19 Left breast 10 o'clock axis 9 cm FN sonoguided core biopsy showed a ~ 0.5 cm IDC with micropapillary features, SBR 8/9, ER 98%, [place] 0%, Ki-67 28% and HER-2 equivocal but FISH negative\par 08/26/19 MRI of the breast showed a susceptibility artifact from a biopsy marker at the left 10 o'clock axis, indicating the site of biopsy proven malignancy. A post biopsy hematoma noted measuring 2.3 cm. 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 Distance of nearest margin anterior 0.6 cm. 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 Griffin.\par 12/2019 Anastrozole started, changed to letrozole in 9/20 due to arthralgias [de-identified] : 0.5 cm Invasive ductal carcinoma with micropapillary features, SBR 8/9, ER 98%, [place] 0%, Ki-67 28%, HER-2 equivocal FISH negative, 0/5 SLN, Distance of nearest margin anterior 0.6 cm [de-identified] : Vandana is seen for a virtual follow up visit today due to the COVID-19 pandemic.\par She changed from anastrozole to letrozole in  after developing arthralgias, especially in her hands. She is doing much better with no pain in her hands on the letrozole. She is also taking turmeric and glucosamine chondroitin. She continues to have trouble with her back due to arthritis. She saw an orthopedist and pain management and had a cortisone injection in  and  which helped a lot for about 6 weeks. She has a slipped disc which has caused sciatica and this does limit her.\par She has also developed sweats with any exertion, but no hot flashes at night. She denies vaginal dryness.\par She is scheduled for her first vaccine next week.\par \par HEALTH MAINTENANCE:\par Mammogram: 20\par Pap smear:  normal\par Bone density DEXA scan:  showed osteoporosis and started Prolia  with her PCP Dr. Smith; repeat in 2020 showed slight improvement\par Colonoscopy: , no polyps\par EGD: \par Genetic testin20 [facility] 47 gene panel negative\par \par

## 2021-03-02 NOTE — REASON FOR VISIT
[Follow-Up Visit] : a follow-up [Home] : at home, [unfilled] , at the time of the visit. [Medical Office: ([facility])___] : at the medical office located in  [Verbal consent obtained from patient] : the patient, [unfilled] [FreeTextEntry2] : ER positive, [place] negative, HER-2 negative Breast Cancer

## 2021-03-02 NOTE — PHYSICAL EXAM
[Fully active, able to carry on all pre-disease performance without restriction] : Status 0 - Fully active, able to carry on all pre-disease performance without restriction [Normal] : affect appropriate [de-identified] : Normal respiratory effort. Speaking in full sentences without difficulty

## 2021-08-27 NOTE — H&P ADULT - NSHPSOURCEINFORD_GEN_ALL_CORE
Chart(s)/Patient Mustarde Flap Text: The defect edges were debeveled with a #15 scalpel blade.  Given the size, depth and location of the defect and the proximity to free margins a Mustarde flap was deemed most appropriate.  Using a sterile surgical marker, an appropriate flap was drawn incorporating the defect. The area thus outlined was incised with a #15 scalpel blade.  The skin margins were undermined to an appropriate distance in all directions utilizing iris scissors.

## 2021-08-31 ENCOUNTER — OUTPATIENT (OUTPATIENT)
Dept: OUTPATIENT SERVICES | Facility: HOSPITAL | Age: 71
LOS: 1 days | End: 2021-08-31
Payer: MEDICARE

## 2021-08-31 ENCOUNTER — RESULT REVIEW (OUTPATIENT)
Age: 71
End: 2021-08-31

## 2021-08-31 ENCOUNTER — APPOINTMENT (OUTPATIENT)
Dept: ULTRASOUND IMAGING | Facility: CLINIC | Age: 71
End: 2021-08-31
Payer: MEDICARE

## 2021-08-31 ENCOUNTER — APPOINTMENT (OUTPATIENT)
Dept: MAMMOGRAPHY | Facility: CLINIC | Age: 71
End: 2021-08-31
Payer: MEDICARE

## 2021-08-31 DIAGNOSIS — C50.912 MALIGNANT NEOPLASM OF UNSPECIFIED SITE OF LEFT FEMALE BREAST: ICD-10-CM

## 2021-08-31 DIAGNOSIS — R00.2 PALPITATIONS: Chronic | ICD-10-CM

## 2021-08-31 DIAGNOSIS — Z98.891 HISTORY OF UTERINE SCAR FROM PREVIOUS SURGERY: Chronic | ICD-10-CM

## 2021-08-31 DIAGNOSIS — Z98.51 TUBAL LIGATION STATUS: Chronic | ICD-10-CM

## 2021-08-31 DIAGNOSIS — Z90.49 ACQUIRED ABSENCE OF OTHER SPECIFIED PARTS OF DIGESTIVE TRACT: Chronic | ICD-10-CM

## 2021-08-31 PROCEDURE — 77066 DX MAMMO INCL CAD BI: CPT

## 2021-08-31 PROCEDURE — 77066 DX MAMMO INCL CAD BI: CPT | Mod: 26

## 2021-08-31 PROCEDURE — G0279: CPT | Mod: 26

## 2021-08-31 PROCEDURE — G0279: CPT

## 2021-08-31 PROCEDURE — 76641 ULTRASOUND BREAST COMPLETE: CPT | Mod: 26,50

## 2021-08-31 PROCEDURE — 76641 ULTRASOUND BREAST COMPLETE: CPT

## 2021-09-27 ENCOUNTER — OUTPATIENT (OUTPATIENT)
Dept: OUTPATIENT SERVICES | Facility: HOSPITAL | Age: 71
LOS: 1 days | Discharge: HOME | End: 2021-09-27

## 2021-09-27 DIAGNOSIS — Z90.49 ACQUIRED ABSENCE OF OTHER SPECIFIED PARTS OF DIGESTIVE TRACT: Chronic | ICD-10-CM

## 2021-09-27 DIAGNOSIS — C50.919 MALIGNANT NEOPLASM OF UNSPECIFIED SITE OF UNSPECIFIED FEMALE BREAST: ICD-10-CM

## 2021-09-27 DIAGNOSIS — Z98.891 HISTORY OF UTERINE SCAR FROM PREVIOUS SURGERY: Chronic | ICD-10-CM

## 2021-09-27 DIAGNOSIS — Z98.51 TUBAL LIGATION STATUS: Chronic | ICD-10-CM

## 2021-09-27 DIAGNOSIS — R00.2 PALPITATIONS: Chronic | ICD-10-CM

## 2021-09-28 ENCOUNTER — APPOINTMENT (OUTPATIENT)
Dept: SURGICAL ONCOLOGY | Facility: CLINIC | Age: 71
End: 2021-09-28
Payer: MEDICARE

## 2021-09-28 ENCOUNTER — APPOINTMENT (OUTPATIENT)
Dept: HEMATOLOGY ONCOLOGY | Facility: CLINIC | Age: 71
End: 2021-09-28
Payer: MEDICARE

## 2021-10-14 ENCOUNTER — APPOINTMENT (OUTPATIENT)
Dept: HEMATOLOGY ONCOLOGY | Facility: CLINIC | Age: 71
End: 2021-10-14
Payer: MEDICARE

## 2021-10-14 VITALS
HEART RATE: 88 BPM | TEMPERATURE: 97.8 F | DIASTOLIC BLOOD PRESSURE: 88 MMHG | HEIGHT: 60.98 IN | SYSTOLIC BLOOD PRESSURE: 152 MMHG | WEIGHT: 179.68 LBS | BODY MASS INDEX: 33.92 KG/M2 | OXYGEN SATURATION: 98 % | RESPIRATION RATE: 17 BRPM

## 2021-10-14 PROCEDURE — 99213 OFFICE O/P EST LOW 20 MIN: CPT

## 2021-10-14 NOTE — HISTORY OF PRESENT ILLNESS
[Disease: _____________________] : Disease: [unfilled] [T: ___] : T[unfilled] [N: ___] : N[unfilled] [M: ___] : M[unfilled] [AJCC Stage: ____] : AJCC Stage: [unfilled] [de-identified] : Left breast cancer diagnosed at the age of 69\par 07/29/19 Screening mammogram showed a left breast neodensity measuring 4 to 5 mm in the left central upper inner quadrant. \par 08/05/19 Left breast mammogram  and ultrasound showed persistent nodule in the left upper inner quadrant corresponding with a 4 mm irregular hypoechoic nodule in the 10 o'clock axis on corresponding ultrasound.\par 08/12/19 Left breast 10 o'clock axis 9 cm FN sonoguided core biopsy showed a ~ 0.5 cm IDC with micropapillary features, SBR 8/9, ER 98%, [place] 0%, Ki-67 28% and HER-2 equivocal but FISH negative\par 08/26/19 MRI of the breast showed a susceptibility artifact from a biopsy marker at the left 10 o'clock axis, indicating the site of biopsy proven malignancy. A post biopsy hematoma noted measuring 2.3 cm. 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 Distance of nearest margin anterior 0.6 cm. 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 \par She is doing much better with much less pain in her hands on the letrozole. She is also taking turmeric and glucosamine chondroitin. \par She continues to have trouble with her back due to arthritis. She had an epidural last week for her sciatica which has really helped.\par She has also developed sweats with any exertion, but no hot flashes at night. She denies vaginal dryness.\par She is fully vaccinated for COVID with the Pfizer vaccine in . She will get a booster this fall.\par \par HEALTH MAINTENANCE:\par PCP: Jorje Smith MD\par Mammogram: 21 BI-RADS 2\par Pap smear:  normal\par Bone density DEXA scan: 2018 showed osteoporosis and started Prolia  with her PCP Dr. Smith; repeat in 2020 showed slight improvement\par Colonoscopy: , no polyps\par EGD: \par Genetic testin20 [facility] 47 gene panel negative\par \par

## 2021-10-20 NOTE — H&P PST ADULT - NEGATIVE BREAST SYMPTOMS
Medical Necessity Information: It is in your best interest to select a reason for this procedure from the list below. All of these items fulfill various CMS LCD requirements except the new and changing color options. Detail Level: Detailed Show Topical Anesthesia Variable?: Yes Post-Care Instructions: I reviewed with the patient in detail post-care instructions. Patient is to wear sunprotection, and avoid picking at any of the treated lesions. Pt may apply Vaseline to crusted or scabbing areas. Render Post-Care Instructions In Note?: no Medical Necessity Clause: This procedure was medically necessary because the lesions that were treated were: Consent: The patient's consent was obtained including but not limited to risks of crusting, scabbing, blistering, scarring, darker or lighter pigmentary change, recurrence, incomplete removal and infection. no breast tenderness L/no breast tenderness R/no nipple discharge R/no breast lump R/no breast lump L/no nipple discharge L

## 2021-10-26 ENCOUNTER — APPOINTMENT (OUTPATIENT)
Dept: SURGICAL ONCOLOGY | Facility: CLINIC | Age: 71
End: 2021-10-26
Payer: MEDICARE

## 2021-11-08 ENCOUNTER — APPOINTMENT (OUTPATIENT)
Dept: SURGICAL ONCOLOGY | Facility: CLINIC | Age: 71
End: 2021-11-08
Payer: MEDICARE

## 2021-11-08 VITALS
HEART RATE: 83 BPM | HEIGHT: 60.98 IN | SYSTOLIC BLOOD PRESSURE: 128 MMHG | BODY MASS INDEX: 33.04 KG/M2 | TEMPERATURE: 96.1 F | WEIGHT: 175 LBS | DIASTOLIC BLOOD PRESSURE: 76 MMHG | RESPIRATION RATE: 16 BRPM | OXYGEN SATURATION: 97 %

## 2021-11-08 PROCEDURE — 99214 OFFICE O/P EST MOD 30 MIN: CPT

## 2021-11-10 NOTE — PHYSICAL EXAM
[Normal] : supple, no neck mass and thyroid not enlarged [Normal Neck Lymph Nodes] : normal neck lymph nodes  [Normal Supraclavicular Lymph Nodes] : normal supraclavicular lymph nodes [Normal Axillary Lymph Nodes] : normal axillary lymph nodes [Normal] : oriented to person, place and time, with appropriate affect [FreeTextEntry1] : BONILLA present for exam  [de-identified] : Normal S1, S2. regular rate and rhythm. [de-identified] : Complete breast exam performed in supine and upright positions. No palpable masses, tenderness, nipple discharge, inversion, deviation or enlarged axillary or supraclavicular lymph nodes bilaterally.  [de-identified] : Clear breath sounds bilaterally, normal respiratory effort.

## 2021-11-10 NOTE — ASSESSMENT
[FreeTextEntry1] : Imp:\par s/p left breast lumpectomy and left axillary sentinel  lymph node biopsy on 9/11/19.  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  0/5 LNs and negative margins.  Tumor stage: T1N0\par \par On Letrozole under the care of Dr Tate. \par mammo and US revealed no evidence of malignancy, \par \par Plan:\par Continue yearly breast surveillance.\par

## 2021-11-10 NOTE — HISTORY OF PRESENT ILLNESS
[de-identified] : Vandana Mora is a 70 y/o female patient presenting for a follow up visit.  \par She is s/p left breast lumpectomy and left axillary sentinel  lymph node biopsy on 19.  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  0/5 LNs and negative margins.  Tumor stage: T1N0\par \par She is currently on Letrazole since 2020 (switched from anastrazole due to arthralgias) under the care of Dr Tate. \par \par Diagnostic mammo and US on 21 revealed no evidence of malignancy, BIRADS 2. \par \par Pertinent history:\par Pathology 19: LT breast 10:00 9cmfn: Moderately differentiated invasive ductal carcinoma with micropapillary growth pattern measuring 0.5 cm in maximal length in this material. \par \par FMHx of Breast CA from mother at 57 y/o. Mother is . \par Pt has a hx of HTN, asthma, RT bundle branch block catheter negative dx on 2019, bronchitis, Hep A in , GERD, IBS. In addition to Eclampsia in , Vasovagal, and skin CA. \par \par \par Menarche: 12 y/o\par LMP: \par \par Age at first pregnancy: 24 y/o\par NKDA

## 2022-02-22 ENCOUNTER — RX RENEWAL (OUTPATIENT)
Age: 72
End: 2022-02-22

## 2022-04-13 ENCOUNTER — OUTPATIENT (OUTPATIENT)
Dept: OUTPATIENT SERVICES | Facility: HOSPITAL | Age: 72
LOS: 1 days | Discharge: HOME | End: 2022-04-13

## 2022-04-13 DIAGNOSIS — C50.919 MALIGNANT NEOPLASM OF UNSPECIFIED SITE OF UNSPECIFIED FEMALE BREAST: ICD-10-CM

## 2022-04-13 DIAGNOSIS — Z98.51 TUBAL LIGATION STATUS: Chronic | ICD-10-CM

## 2022-04-13 DIAGNOSIS — Z90.49 ACQUIRED ABSENCE OF OTHER SPECIFIED PARTS OF DIGESTIVE TRACT: Chronic | ICD-10-CM

## 2022-04-13 DIAGNOSIS — Z98.891 HISTORY OF UTERINE SCAR FROM PREVIOUS SURGERY: Chronic | ICD-10-CM

## 2022-04-13 DIAGNOSIS — R00.2 PALPITATIONS: Chronic | ICD-10-CM

## 2022-04-19 ENCOUNTER — APPOINTMENT (OUTPATIENT)
Dept: HEMATOLOGY ONCOLOGY | Facility: CLINIC | Age: 72
End: 2022-04-19
Payer: MEDICARE

## 2022-04-19 VITALS
HEART RATE: 63 BPM | BODY MASS INDEX: 33.97 KG/M2 | WEIGHT: 179.67 LBS | RESPIRATION RATE: 14 BRPM | DIASTOLIC BLOOD PRESSURE: 80 MMHG | OXYGEN SATURATION: 94 % | SYSTOLIC BLOOD PRESSURE: 137 MMHG | TEMPERATURE: 97.9 F

## 2022-04-19 PROCEDURE — 99214 OFFICE O/P EST MOD 30 MIN: CPT

## 2022-04-19 RX ORDER — GABAPENTIN 100 MG/1
100 CAPSULE ORAL
Qty: 90 | Refills: 0 | Status: ACTIVE | COMMUNITY
Start: 2022-03-10

## 2022-04-19 RX ORDER — ALBUTEROL SULFATE 90 UG/1
108 (90 BASE) INHALANT RESPIRATORY (INHALATION)
Qty: 8 | Refills: 0 | Status: ACTIVE | COMMUNITY
Start: 2021-12-13

## 2022-04-19 RX ORDER — VALACYCLOVIR 1 G/1
1 TABLET, FILM COATED ORAL
Qty: 4 | Refills: 0 | Status: ACTIVE | COMMUNITY
Start: 2022-03-22

## 2022-04-19 RX ORDER — CLINDAMYCIN PHOSPHATE 10 MG/ML
1 LOTION TOPICAL
Qty: 60 | Refills: 0 | Status: ACTIVE | COMMUNITY
Start: 2021-05-12

## 2022-04-19 RX ORDER — ATORVASTATIN CALCIUM 40 MG/1
40 TABLET, FILM COATED ORAL
Qty: 90 | Refills: 0 | Status: ACTIVE | COMMUNITY
Start: 2022-03-22

## 2022-04-19 RX ORDER — FAMOTIDINE 40 MG/1
40 TABLET, FILM COATED ORAL
Qty: 90 | Refills: 0 | Status: ACTIVE | COMMUNITY
Start: 2021-10-11

## 2022-04-19 RX ORDER — ATORVASTATIN CALCIUM 20 MG/1
20 TABLET, FILM COATED ORAL
Qty: 30 | Refills: 0 | Status: DISCONTINUED | COMMUNITY
Start: 2019-10-08 | End: 2022-04-19

## 2022-04-19 RX ORDER — ACYCLOVIR 50 MG/G
5 OINTMENT TOPICAL
Qty: 30 | Refills: 0 | Status: ACTIVE | COMMUNITY
Start: 2022-03-22

## 2022-04-19 NOTE — HISTORY OF PRESENT ILLNESS
[Disease: _____________________] : Disease: [unfilled] [T: ___] : T[unfilled] [N: ___] : N[unfilled] [M: ___] : M[unfilled] [AJCC Stage: ____] : AJCC Stage: [unfilled] [de-identified] : Left breast cancer diagnosed at the age of 69\par 07/29/19 Screening mammogram showed a left breast neodensity measuring 4 to 5 mm in the left central upper inner quadrant. \par 08/05/19 Left breast mammogram  and ultrasound showed persistent nodule in the left upper inner quadrant corresponding with a 4 mm irregular hypoechoic nodule in the 10 o'clock axis on corresponding ultrasound.\par 08/12/19 Left breast 10 o'clock axis 9 cm FN sonoguided core biopsy showed a ~ 0.5 cm IDC with micropapillary features, SBR 8/9, ER 98%, [place] 0%, Ki-67 28% and HER-2 equivocal but FISH negative\par 08/26/19 MRI of the breast showed a susceptibility artifact from a biopsy marker at the left 10 o'clock axis, indicating the site of biopsy proven malignancy. A post biopsy hematoma noted measuring 2.3 cm. 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 Distance of nearest margin anterior 0.6 cm. 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 She had an epidural in 10/21 for her sciatica which helped. She is seeing pain management and will be having a new procedure for her back. She is also on gabapentin which is helping. She also takes Advil as needed.\par She is also taking turmeric and glucosamine chondroitin which she thinks is helpful for her arthritic pain.\par She has also developed sweats with any exertion, but no hot flashes at night. She denies vaginal dryness.\par \par HEALTH MAINTENANCE:\par PCP: Jorje Smith MD\par Mammogram: 21 BI-RADS 2\par Pap smear:  normal\par Bone density DEXA scan:  showed osteoporosis and started Prolia  with her PCP Dr. Smith; repeat in 2020 showed slight improvement\par Colonoscopy: , no polyps\par EGD: \par Genetic testin20 [facility] 47 gene panel negative\par \par

## 2022-06-08 NOTE — ASU PATIENT PROFILE, ADULT - PRO TOBACCO TYPE
Patient to ED via [facility], patient was seen at her oncology office at [facility] for a follow up appt, and she was hypotensive. Patient states she was feeling dizzy yesterday and not feeling well this morning. Patient had port accessed PTA and received 100ml pta. Patient denies chest pain, SOB,n/v/d,fever, chills.   
cigarettes

## 2022-08-15 NOTE — ASU PATIENT PROFILE, ADULT - PAIN CHRONIC, PROFILE
Fall with chest wall contusion  Xray chest wall today, will check for rib fractures and sternal fracture  Would avoid taking the Norco for pain  Can take Tylenol 1000 every 8 hours for pain  Can also use over the counter lidocaine patch 4% on the area for 12 hours on and 12 hours of  Name is Raymundo Acevedo or Cain Mancuso    yes

## 2022-09-08 ENCOUNTER — RESULT REVIEW (OUTPATIENT)
Age: 72
End: 2022-09-08

## 2022-09-08 ENCOUNTER — APPOINTMENT (OUTPATIENT)
Dept: ULTRASOUND IMAGING | Facility: CLINIC | Age: 72
End: 2022-09-08

## 2022-09-08 ENCOUNTER — OUTPATIENT (OUTPATIENT)
Dept: OUTPATIENT SERVICES | Facility: HOSPITAL | Age: 72
LOS: 1 days | End: 2022-09-08
Payer: MEDICARE

## 2022-09-08 ENCOUNTER — APPOINTMENT (OUTPATIENT)
Dept: MAMMOGRAPHY | Facility: CLINIC | Age: 72
End: 2022-09-08

## 2022-09-08 DIAGNOSIS — Z98.51 TUBAL LIGATION STATUS: Chronic | ICD-10-CM

## 2022-09-08 DIAGNOSIS — C50.912 MALIGNANT NEOPLASM OF UNSPECIFIED SITE OF LEFT FEMALE BREAST: ICD-10-CM

## 2022-09-08 DIAGNOSIS — R00.2 PALPITATIONS: Chronic | ICD-10-CM

## 2022-09-08 DIAGNOSIS — Z90.49 ACQUIRED ABSENCE OF OTHER SPECIFIED PARTS OF DIGESTIVE TRACT: Chronic | ICD-10-CM

## 2022-09-08 DIAGNOSIS — Z98.891 HISTORY OF UTERINE SCAR FROM PREVIOUS SURGERY: Chronic | ICD-10-CM

## 2022-09-08 PROCEDURE — 76641 ULTRASOUND BREAST COMPLETE: CPT | Mod: 26,50

## 2022-09-08 PROCEDURE — G0279: CPT | Mod: 26

## 2022-09-08 PROCEDURE — 76641 ULTRASOUND BREAST COMPLETE: CPT

## 2022-09-08 PROCEDURE — G0279: CPT

## 2022-09-08 PROCEDURE — 77066 DX MAMMO INCL CAD BI: CPT

## 2022-09-08 PROCEDURE — 77066 DX MAMMO INCL CAD BI: CPT | Mod: 26

## 2022-10-12 ENCOUNTER — OUTPATIENT (OUTPATIENT)
Dept: OUTPATIENT SERVICES | Facility: HOSPITAL | Age: 72
LOS: 1 days | Discharge: HOME | End: 2022-10-12

## 2022-10-12 DIAGNOSIS — Z90.49 ACQUIRED ABSENCE OF OTHER SPECIFIED PARTS OF DIGESTIVE TRACT: Chronic | ICD-10-CM

## 2022-10-12 DIAGNOSIS — Z98.891 HISTORY OF UTERINE SCAR FROM PREVIOUS SURGERY: Chronic | ICD-10-CM

## 2022-10-12 DIAGNOSIS — C50.919 MALIGNANT NEOPLASM OF UNSPECIFIED SITE OF UNSPECIFIED FEMALE BREAST: ICD-10-CM

## 2022-10-12 DIAGNOSIS — Z98.51 TUBAL LIGATION STATUS: Chronic | ICD-10-CM

## 2022-10-12 DIAGNOSIS — R00.2 PALPITATIONS: Chronic | ICD-10-CM

## 2022-10-18 ENCOUNTER — APPOINTMENT (OUTPATIENT)
Dept: SURGICAL ONCOLOGY | Facility: CLINIC | Age: 72
End: 2022-10-18

## 2022-10-18 ENCOUNTER — APPOINTMENT (OUTPATIENT)
Dept: HEMATOLOGY ONCOLOGY | Facility: CLINIC | Age: 72
End: 2022-10-18

## 2022-10-18 VITALS
SYSTOLIC BLOOD PRESSURE: 136 MMHG | HEART RATE: 60 BPM | WEIGHT: 179 LBS | RESPIRATION RATE: 17 BRPM | BODY MASS INDEX: 33.79 KG/M2 | TEMPERATURE: 97.8 F | HEIGHT: 60.98 IN | DIASTOLIC BLOOD PRESSURE: 77 MMHG | OXYGEN SATURATION: 95 %

## 2022-10-18 VITALS
HEART RATE: 73 BPM | RESPIRATION RATE: 16 BRPM | SYSTOLIC BLOOD PRESSURE: 133 MMHG | TEMPERATURE: 97.7 F | BODY MASS INDEX: 33.1 KG/M2 | DIASTOLIC BLOOD PRESSURE: 76 MMHG | OXYGEN SATURATION: 97 % | WEIGHT: 175.05 LBS

## 2022-10-18 PROCEDURE — 99214 OFFICE O/P EST MOD 30 MIN: CPT

## 2022-10-18 PROCEDURE — 99213 OFFICE O/P EST LOW 20 MIN: CPT

## 2022-10-18 NOTE — HISTORY OF PRESENT ILLNESS
[de-identified] : Vandana Mora is a 71 y/o female patient presenting for a follow up visit.  \par She is s/p left breast lumpectomy and left axillary sentinel  lymph node biopsy on 19.  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  0/5 LNs and negative margins.  Tumor stage: T1N0\par \par She is currently on Letrazole since 2020 (switched from anastrazole due to arthralgias) under the care of Dr Tate. \par \par Diagnostic mammo and US on 22 revealed no evidence of malignancy, BIRADS 2. \par \par Today, She denies palpable breast masses, nipple discharge, skin changes, inversion or breast pain. Denies constitutional symptoms. No health changes. \par \par Pertinent history:\par Pathology 19: LT breast 10:00 9cmfn: Moderately differentiated invasive ductal carcinoma with micropapillary growth pattern measuring 0.5 cm in maximal length in this material. \par \par FMHx of Breast CA from mother at 57 y/o. Mother is . \par Pt has a hx of HTN, asthma, RT bundle branch block catheter negative dx on 2019, bronchitis, Hep A in , GERD, IBS. In addition to Eclampsia in , Vasovagal, and skin CA. \par \par \par Menarche: 10 y/o\par LMP: \par \par Age at first pregnancy: 22 y/o\par NKDA

## 2022-10-18 NOTE — ASSESSMENT
[FreeTextEntry1] : Imp:\par s/p left breast lumpectomy and left axillary sentinel  lymph node biopsy on 9/11/19.  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  0/5 LNs and negative margins.  Tumor stage: T1N0\par \par On Letrozole under the care of Dr Tate. \par mammo and US revealed no evidence of malignancy, \par \par Plan:\par Continue yearly breast surveillance.\par MMG/US Sept 2023- ordered\par \par All medical entries were at my, Dr. Edd Reyes, direction. I have reviewed the chart and agree that the record accurately reflects my personal performance of the history, physical exam, assessment and plan. Our office Nurse Practitioner was present of the duration of the office visit. \par

## 2022-10-18 NOTE — PHYSICAL EXAM
[Normal] : supple, no neck mass and thyroid not enlarged [Normal Neck Lymph Nodes] : normal neck lymph nodes  [Normal Supraclavicular Lymph Nodes] : normal supraclavicular lymph nodes [Normal Axillary Lymph Nodes] : normal axillary lymph nodes [Normal] : oriented to person, place and time, with appropriate affect [FreeTextEntry1] : SC present for exam  [de-identified] : Normal S1, S2. regular rate and rhythm. [de-identified] : Complete breast exam performed in supine and upright positions. No palpable masses, tenderness, nipple discharge, inversion, deviation or enlarged axillary or supraclavicular lymph nodes bilaterally.  [de-identified] : Clear breath sounds bilaterally, normal respiratory effort.

## 2022-10-19 NOTE — HISTORY OF PRESENT ILLNESS
[Disease: _____________________] : Disease: [unfilled] [T: ___] : T[unfilled] [N: ___] : N[unfilled] [M: ___] : M[unfilled] [AJCC Stage: ____] : AJCC Stage: [unfilled] [de-identified] : Left breast cancer diagnosed at the age of 69\par 07/29/19 Screening mammogram showed a left breast neodensity measuring 4 to 5 mm in the left central upper inner quadrant. \par 08/05/19 Left breast mammogram  and ultrasound showed persistent nodule in the left upper inner quadrant corresponding with a 4 mm irregular hypoechoic nodule in the 10 o'clock axis on corresponding ultrasound.\par 08/12/19 Left breast 10 o'clock axis 9 cm FN sonoguided core biopsy showed a ~ 0.5 cm IDC with micropapillary features, SBR 8/9, ER 98%, [place] 0%, Ki-67 28% and HER-2 equivocal but FISH negative\par 08/26/19 MRI of the breast showed a susceptibility artifact from a biopsy marker at the left 10 o'clock axis, indicating the site of biopsy proven malignancy. A post biopsy hematoma noted measuring 2.3 cm. 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 Distance of nearest margin anterior 0.6 cm. 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 She had an epidural in 10/21 for her sciatica and then a nerve block in 10/22 for her back pain. She is seeing pain management. She is also on gabapentin which is helping. She also takes Advil as needed.\par She is also taking turmeric and glucosamine chondroitin which she thinks is helpful for her arthritic pain.\par She is not getting hot flashes or vaginal dryness.\par \par HEALTH MAINTENANCE:\par PCP: Jorje Smith MD\par Mammogram: 22 BI-RADS 2\par Pap smear:  normal\par Bone density DEXA scan: 20 shoed T scores of -2.6 in femoral neck. She continues on Prolia, next in .\par                                            showed osteoporosis and started Prolia  with her PCP Dr. Smith; repeat in 2020 showed slight improvement\par Colonoscopy: , no polyps, next in \par EGD: \par Genetic testin20 [facility] 47 gene panel negative\par \par

## 2023-03-05 NOTE — ASU PREOP CHECKLIST - ANTIBIOTIC
***************************************************************  Sondra Carbajal, PGY2  Internal Medicine   pager: [facility]: [phone], [facility]: [phone]  ***************************************************************    YOGI LYNN  81y  MRN: [id number]    Patient is a 81y old  Male who presents with a chief complaint of AMS, r/o mastoiditis (04 Mar 2023 11:48)      Subjective: no events ON. Denies fever, CP, SOB, abn pain, N/V, dysuria. Tolerating diet.      MEDICATIONS  (STANDING):  atorvastatin 80 milliGRAM(s) Oral at bedtime  carbamide peroxide Otic Solution 8 Drop(s) Both Ears two times a day  carvedilol 3.125 milliGRAM(s) Oral every 12 hours  cefepime   IVPB 1000 milliGRAM(s) IV Intermittent every 12 hours  dextrose 5%. 1000 milliLiter(s) (50 mL/Hr) IV Continuous <Continuous>  dextrose 5%. 1000 milliLiter(s) (100 mL/Hr) IV Continuous <Continuous>  dextrose 50% Injectable 25 Gram(s) IV Push once  dextrose 50% Injectable 12.5 Gram(s) IV Push once  dextrose 50% Injectable 25 Gram(s) IV Push once  enoxaparin Injectable 40 milliGRAM(s) SubCutaneous every 24 hours  glucagon  Injectable 1 milliGRAM(s) IntraMuscular once  influenza  Vaccine (HIGH DOSE) 0.7 milliLiter(s) IntraMuscular once  insulin lispro (ADMELOG) corrective regimen sliding scale   SubCutaneous three times a day before meals  insulin lispro (ADMELOG) corrective regimen sliding scale   SubCutaneous at bedtime  nicotine -   7 mG/24Hr(s) Patch 1 Patch Transdermal every 24 hours  sodium chloride 0.65% Nasal 1 Spray(s) Both Nostrils two times a day  vancomycin  IVPB 1000 milliGRAM(s) IV Intermittent every 12 hours    MEDICATIONS  (PRN):  acetaminophen     Tablet .. 650 milliGRAM(s) Oral every 6 hours PRN Temp greater or equal to 38C (100.4F), Mild Pain (1 - 3)  dextrose Oral Gel 15 Gram(s) Oral once PRN Blood Glucose LESS THAN 70 milliGRAM(s)/deciliter  melatonin 3 milliGRAM(s) Oral at bedtime PRN Insomnia      Objective:    Vitals: Vital Signs Last 24 Hrs  T(C): 36.9 (03-05-23 @ 05:00), Max: 36.9 (03-05-23 @ 05:00)  T(F): 98.5 (03-05-23 @ 05:00), Max: 98.5 (03-05-23 @ 05:00)  HR: 77 (03-05-23 @ 05:00) (73 - 79)  BP: 118/73 (03-05-23 @ 05:00) (118/73 - 123/76)  BP(mean): --  RR: 18 (03-05-23 @ 05:00) (18 - 18)  SpO2: 100% (03-05-23 @ 05:00) (100% - 100%)            I&O's Summary      PHYSICAL EXAM:  GENERAL: NAD  HEAD:  Atraumatic, Normocephalic  EYES: EOMI, conjunctiva and sclera clear  CHEST/LUNG: Clear to auscultation bilaterally; No rales, rhonchi, wheezing, or rubs  HEART: Regular rate and rhythm; No murmurs, rubs, or gallops  ABDOMEN: Soft, Nontender, Nondistended;   SKIN: No rashes or lesions  NERVOUS SYSTEM:  Alert & Oriented X3, no focal deficits    LABS:  03-04    132<L>  |  96<L>  |  16  ----------------------------<  105<H>  4.1   |  26  |  0.95  03-03    134<L>  |  101  |  20  ----------------------------<  69<L>  4.4   |  21<L>  |  1.00    Ca    9.6      04 Mar 2023 05:34  Ca    9.1      03 Mar 2023 07:40  Phos  2.6     03-04  Mg     1.80     03-04                                                18.3   10.34 )-----------( 112      ( 04 Mar 2023 05:34 )             54.1                         16.8   8.00  )-----------( 103      ( 03 Mar 2023 07:40 )             51.3     CAPILLARY BLOOD GLUCOSE      POCT Blood Glucose.: 123 mg/dL (04 Mar 2023 18:20)  POCT Blood Glucose.: 166 mg/dL (04 Mar 2023 17:56)  POCT Blood Glucose.: 137 mg/dL (04 Mar 2023 12:24)  POCT Blood Glucose.: 109 mg/dL (04 Mar 2023 08:34)      RADIOLOGY & ADDITIONAL TESTS:    Imaging Personally Reviewed:  [x ] YES  [ ] NO    Consultants involved in case:   Consultant(s) Notes Reviewed:  [ x] YES  [ ] NO:   Care Discussed with Consultants/Other Providers [x ] YES  [ ] NO         n/a ***************************************************************  Sondra Carbajal, PGY2  Internal Medicine   pager: [facility]: [phone], [facility]: [phone]  ***************************************************************    YOGI LYNN  81y  MRN: [id number]    Patient is a 81y old  Male who presents with a chief complaint of AMS, r/o mastoiditis (04 Mar 2023 11:48)      Subjective: Agitated, got zyprexa overnight. This AM calm but appears confused still.    MEDICATIONS  (STANDING):  atorvastatin 80 milliGRAM(s) Oral at bedtime  carbamide peroxide Otic Solution 8 Drop(s) Both Ears two times a day  carvedilol 3.125 milliGRAM(s) Oral every 12 hours  cefepime   IVPB 1000 milliGRAM(s) IV Intermittent every 12 hours  dextrose 5%. 1000 milliLiter(s) (50 mL/Hr) IV Continuous <Continuous>  dextrose 5%. 1000 milliLiter(s) (100 mL/Hr) IV Continuous <Continuous>  dextrose 50% Injectable 25 Gram(s) IV Push once  dextrose 50% Injectable 12.5 Gram(s) IV Push once  dextrose 50% Injectable 25 Gram(s) IV Push once  enoxaparin Injectable 40 milliGRAM(s) SubCutaneous every 24 hours  glucagon  Injectable 1 milliGRAM(s) IntraMuscular once  influenza  Vaccine (HIGH DOSE) 0.7 milliLiter(s) IntraMuscular once  insulin lispro (ADMELOG) corrective regimen sliding scale   SubCutaneous three times a day before meals  insulin lispro (ADMELOG) corrective regimen sliding scale   SubCutaneous at bedtime  nicotine -   7 mG/24Hr(s) Patch 1 Patch Transdermal every 24 hours  sodium chloride 0.65% Nasal 1 Spray(s) Both Nostrils two times a day  vancomycin  IVPB 1000 milliGRAM(s) IV Intermittent every 12 hours    MEDICATIONS  (PRN):  acetaminophen     Tablet .. 650 milliGRAM(s) Oral every 6 hours PRN Temp greater or equal to 38C (100.4F), Mild Pain (1 - 3)  dextrose Oral Gel 15 Gram(s) Oral once PRN Blood Glucose LESS THAN 70 milliGRAM(s)/deciliter  melatonin 3 milliGRAM(s) Oral at bedtime PRN Insomnia      Objective:    Vitals: Vital Signs Last 24 Hrs  T(C): 36.9 (03-05-23 @ 05:00), Max: 36.9 (03-05-23 @ 05:00)  T(F): 98.5 (03-05-23 @ 05:00), Max: 98.5 (03-05-23 @ 05:00)  HR: 77 (03-05-23 @ 05:00) (73 - 79)  BP: 118/73 (03-05-23 @ 05:00) (118/73 - 123/76)  BP(mean): --  RR: 18 (03-05-23 @ 05:00) (18 - 18)  SpO2: 100% (03-05-23 @ 05:00) (100% - 100%)            I&O's Summary      PHYSICAL EXAM:  GENERAL: NAD  HEAD:  Atraumatic, Normocephalic  EYES: EOMI, conjunctiva and sclera clear  HEENT: secretions in throat  CHEST/LUNG: Coarse breath sounds  HEART: Regular rate and rhythm; No murmurs, rubs, or gallops  ABDOMEN: Soft, Nontender, Nondistended;   SKIN: No rashes or lesions  NERVOUS SYSTEM:  Alert & Oriented X1-2, rolling around in bed confused yelling out    LABS:  03-04    132<L>  |  96<L>  |  16  ----------------------------<  105<H>  4.1   |  26  |  0.95  03-03    134<L>  |  101  |  20  ----------------------------<  69<L>  4.4   |  21<L>  |  1.00    Ca    9.6      04 Mar 2023 05:34  Ca    9.1      03 Mar 2023 07:40  Phos  2.6     03-04  Mg     1.80     03-04                                                18.3   10.34 )-----------( 112      ( 04 Mar 2023 05:34 )             54.1                         16.8   8.00  )-----------( 103      ( 03 Mar 2023 07:40 )             51.3     CAPILLARY BLOOD GLUCOSE      POCT Blood Glucose.: 123 mg/dL (04 Mar 2023 18:20)  POCT Blood Glucose.: 166 mg/dL (04 Mar 2023 17:56)  POCT Blood Glucose.: 137 mg/dL (04 Mar 2023 12:24)  POCT Blood Glucose.: 109 mg/dL (04 Mar 2023 08:34)      RADIOLOGY & ADDITIONAL TESTS:    Imaging Personally Reviewed:  [x ] YES  [ ] NO    Consultants involved in case:   Consultant(s) Notes Reviewed:  [ x] YES  [ ] NO:   Care Discussed with Consultants/Other Providers [x ] YES  [ ] NO

## 2023-04-14 ENCOUNTER — OUTPATIENT (OUTPATIENT)
Dept: OUTPATIENT SERVICES | Facility: HOSPITAL | Age: 73
LOS: 1 days | Discharge: HOME | End: 2023-04-14

## 2023-04-14 DIAGNOSIS — R00.2 PALPITATIONS: Chronic | ICD-10-CM

## 2023-04-14 DIAGNOSIS — C50.919 MALIGNANT NEOPLASM OF UNSPECIFIED SITE OF UNSPECIFIED FEMALE BREAST: ICD-10-CM

## 2023-04-14 DIAGNOSIS — Z98.51 TUBAL LIGATION STATUS: Chronic | ICD-10-CM

## 2023-04-14 DIAGNOSIS — Z98.891 HISTORY OF UTERINE SCAR FROM PREVIOUS SURGERY: Chronic | ICD-10-CM

## 2023-04-14 DIAGNOSIS — Z90.49 ACQUIRED ABSENCE OF OTHER SPECIFIED PARTS OF DIGESTIVE TRACT: Chronic | ICD-10-CM

## 2023-04-18 ENCOUNTER — APPOINTMENT (OUTPATIENT)
Dept: HEMATOLOGY ONCOLOGY | Facility: CLINIC | Age: 73
End: 2023-04-18
Payer: MEDICARE

## 2023-04-18 VITALS
HEART RATE: 79 BPM | WEIGHT: 177.91 LBS | OXYGEN SATURATION: 94 % | TEMPERATURE: 96.7 F | DIASTOLIC BLOOD PRESSURE: 90 MMHG | RESPIRATION RATE: 16 BRPM | BODY MASS INDEX: 33.64 KG/M2 | SYSTOLIC BLOOD PRESSURE: 156 MMHG

## 2023-04-18 PROCEDURE — 99214 OFFICE O/P EST MOD 30 MIN: CPT

## 2023-04-18 NOTE — HISTORY OF PRESENT ILLNESS
[Disease: _____________________] : Disease: [unfilled] [T: ___] : T[unfilled] [N: ___] : N[unfilled] [M: ___] : M[unfilled] [AJCC Stage: ____] : AJCC Stage: [unfilled] [de-identified] : Left breast cancer diagnosed at the age of 69\par 07/29/19 Screening mammogram showed a left breast neodensity measuring 4 to 5 mm in the left central upper inner quadrant. \par 08/05/19 Left breast mammogram  and ultrasound showed persistent nodule in the left upper inner quadrant corresponding with a 4 mm irregular hypoechoic nodule in the 10 o'clock axis on corresponding ultrasound.\par 08/12/19 Left breast 10 o'clock axis 9 cm FN sonoguided core biopsy showed a ~ 0.5 cm IDC with micropapillary features, SBR 8/9, ER 98%, MO 0%, Ki-67 28% and HER-2 equivocal but FISH negative\par 08/26/19 MRI of the breast showed a susceptibility artifact from a biopsy marker at the left 10 o'clock axis, indicating the site of biopsy proven malignancy. A post biopsy hematoma noted measuring 2.3 cm. 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 Distance of nearest margin anterior 0.6 cm. 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 She does not have any hot flashes or vaginal dryness \par She is on gabapentin for her sciatic pain and has epidurals as needed. She had one in  and before that on 10/21. She also had a nerve block in 10/22 for her back pain due to spinal stenosis. She is seeing pain management, Dr. George Watt at [facility]. She is also on gabapentin which is helping. She also takes Advil as needed.\par She is also taking turmeric and glucosamine chondroitin which she thinks is helpful for her arthritic pain.\par \par HEALTH MAINTENANCE:\par PCP: Jorje Smith MD\par Mammogram: 22 BI-RADS 2\par Pap smear: 2018 normal\par Bone density DEXA scan: 20 shoed T scores of -2.6 in femoral neck. She continues on Prolia, next in .\par                                           2018 showed osteoporosis and started Prolia  with her PCP Dr. Smith; repeat in 2020 showed slight improvement\par Colonoscopy: , no polyps, next in \par EGD: \par Genetic testin20 [facility] 47 gene panel negative\par \par

## 2023-09-11 ENCOUNTER — APPOINTMENT (OUTPATIENT)
Dept: MAMMOGRAPHY | Facility: CLINIC | Age: 73
End: 2023-09-11
Payer: MEDICARE

## 2023-09-11 ENCOUNTER — OUTPATIENT (OUTPATIENT)
Dept: OUTPATIENT SERVICES | Facility: HOSPITAL | Age: 73
LOS: 1 days | End: 2023-09-11
Payer: MEDICARE

## 2023-09-11 ENCOUNTER — RESULT REVIEW (OUTPATIENT)
Age: 73
End: 2023-09-11

## 2023-09-11 ENCOUNTER — APPOINTMENT (OUTPATIENT)
Dept: ULTRASOUND IMAGING | Facility: CLINIC | Age: 73
End: 2023-09-11
Payer: MEDICARE

## 2023-09-11 DIAGNOSIS — Z98.51 TUBAL LIGATION STATUS: Chronic | ICD-10-CM

## 2023-09-11 DIAGNOSIS — C50.912 MALIGNANT NEOPLASM OF UNSPECIFIED SITE OF LEFT FEMALE BREAST: ICD-10-CM

## 2023-09-11 DIAGNOSIS — Z98.891 HISTORY OF UTERINE SCAR FROM PREVIOUS SURGERY: Chronic | ICD-10-CM

## 2023-09-11 DIAGNOSIS — R00.2 PALPITATIONS: Chronic | ICD-10-CM

## 2023-09-11 DIAGNOSIS — Z90.49 ACQUIRED ABSENCE OF OTHER SPECIFIED PARTS OF DIGESTIVE TRACT: Chronic | ICD-10-CM

## 2023-09-11 PROCEDURE — G0279: CPT

## 2023-09-11 PROCEDURE — 76641 ULTRASOUND BREAST COMPLETE: CPT | Mod: 26,50,3G

## 2023-09-11 PROCEDURE — 77066 DX MAMMO INCL CAD BI: CPT | Mod: 26

## 2023-09-11 PROCEDURE — 76641 ULTRASOUND BREAST COMPLETE: CPT

## 2023-09-11 PROCEDURE — G0279: CPT | Mod: 26

## 2023-09-11 PROCEDURE — 77066 DX MAMMO INCL CAD BI: CPT

## 2023-10-16 ENCOUNTER — OUTPATIENT (OUTPATIENT)
Dept: OUTPATIENT SERVICES | Facility: HOSPITAL | Age: 73
LOS: 1 days | Discharge: HOME | End: 2023-10-16

## 2023-10-16 DIAGNOSIS — C50.919 MALIGNANT NEOPLASM OF UNSPECIFIED SITE OF UNSPECIFIED FEMALE BREAST: ICD-10-CM

## 2023-10-16 DIAGNOSIS — Z98.51 TUBAL LIGATION STATUS: Chronic | ICD-10-CM

## 2023-10-16 DIAGNOSIS — Z90.49 ACQUIRED ABSENCE OF OTHER SPECIFIED PARTS OF DIGESTIVE TRACT: Chronic | ICD-10-CM

## 2023-10-16 DIAGNOSIS — Z98.891 HISTORY OF UTERINE SCAR FROM PREVIOUS SURGERY: Chronic | ICD-10-CM

## 2023-10-16 DIAGNOSIS — R00.2 PALPITATIONS: Chronic | ICD-10-CM

## 2023-10-17 ENCOUNTER — APPOINTMENT (OUTPATIENT)
Dept: SURGICAL ONCOLOGY | Facility: CLINIC | Age: 73
End: 2023-10-17
Payer: MEDICARE

## 2023-11-07 ENCOUNTER — APPOINTMENT (OUTPATIENT)
Dept: HEMATOLOGY ONCOLOGY | Facility: CLINIC | Age: 73
End: 2023-11-07
Payer: MEDICARE

## 2023-11-07 ENCOUNTER — APPOINTMENT (OUTPATIENT)
Dept: SURGICAL ONCOLOGY | Facility: CLINIC | Age: 73
End: 2023-11-07
Payer: MEDICARE

## 2023-11-07 VITALS
TEMPERATURE: 98.7 F | OXYGEN SATURATION: 95 % | SYSTOLIC BLOOD PRESSURE: 138 MMHG | DIASTOLIC BLOOD PRESSURE: 81 MMHG | HEART RATE: 70 BPM | RESPIRATION RATE: 15 BRPM | WEIGHT: 176.37 LBS | BODY MASS INDEX: 33.35 KG/M2

## 2023-11-07 VITALS
DIASTOLIC BLOOD PRESSURE: 75 MMHG | WEIGHT: 176 LBS | HEIGHT: 60 IN | OXYGEN SATURATION: 98 % | HEART RATE: 72 BPM | BODY MASS INDEX: 34.55 KG/M2 | RESPIRATION RATE: 16 BRPM | SYSTOLIC BLOOD PRESSURE: 122 MMHG

## 2023-11-07 PROCEDURE — 99213 OFFICE O/P EST LOW 20 MIN: CPT

## 2023-11-07 PROCEDURE — 99214 OFFICE O/P EST MOD 30 MIN: CPT

## 2023-11-15 NOTE — H&P PST ADULT - NSCAFFEINETYPE_GEN_ALL_CORE_SD
coffee/tea Soolantra Pregnancy And Lactation Text: This medication is Pregnancy Category C. This medication is considered safe during breast feeding.

## 2023-12-21 ENCOUNTER — RX RENEWAL (OUTPATIENT)
Age: 73
End: 2023-12-21

## 2024-03-15 ENCOUNTER — RX RENEWAL (OUTPATIENT)
Age: 74
End: 2024-03-15

## 2024-03-15 RX ORDER — LETROZOLE TABLETS 2.5 MG/1
2.5 TABLET, FILM COATED ORAL
Qty: 90 | Refills: 1 | Status: ACTIVE | COMMUNITY
Start: 2020-09-03 | End: 1900-01-01

## 2024-04-30 ENCOUNTER — OUTPATIENT (OUTPATIENT)
Dept: OUTPATIENT SERVICES | Facility: HOSPITAL | Age: 74
LOS: 1 days | Discharge: HOME | End: 2024-04-30

## 2024-04-30 DIAGNOSIS — Z90.49 ACQUIRED ABSENCE OF OTHER SPECIFIED PARTS OF DIGESTIVE TRACT: Chronic | ICD-10-CM

## 2024-04-30 DIAGNOSIS — C50.919 MALIGNANT NEOPLASM OF UNSPECIFIED SITE OF UNSPECIFIED FEMALE BREAST: ICD-10-CM

## 2024-04-30 DIAGNOSIS — R00.2 PALPITATIONS: Chronic | ICD-10-CM

## 2024-04-30 DIAGNOSIS — Z98.891 HISTORY OF UTERINE SCAR FROM PREVIOUS SURGERY: Chronic | ICD-10-CM

## 2024-04-30 DIAGNOSIS — Z98.51 TUBAL LIGATION STATUS: Chronic | ICD-10-CM

## 2024-05-08 ENCOUNTER — APPOINTMENT (OUTPATIENT)
Dept: HEMATOLOGY ONCOLOGY | Facility: CLINIC | Age: 74
End: 2024-05-08

## 2024-05-08 NOTE — HISTORY OF PRESENT ILLNESS
[Disease: _____________________] : Disease: [unfilled] [T: ___] : T[unfilled] [N: ___] : N[unfilled] [M: ___] : M[unfilled] [AJCC Stage: ____] : AJCC Stage: [unfilled] [de-identified] : Left breast cancer diagnosed at the age of 69 07/29/19 Screening mammogram showed a left breast neodensity measuring 4 to 5 mm in the left central upper inner quadrant.  08/05/19 Left breast mammogram  and ultrasound showed persistent nodule in the left upper inner quadrant corresponding with a 4 mm irregular hypoechoic nodule in the 10 o'clock axis on corresponding ultrasound. 08/12/19 Left breast 10 o'clock axis 9 cm FN sonoguided core biopsy showed a ~ 0.5 cm IDC with micropapillary features, SBR 8/9, ER 98%, CA 0%, Ki-67 28% and HER-2 equivocal but FISH negative 08/26/19 MRI of the breast showed a susceptibility artifact from a biopsy marker at the left 10 o'clock axis, indicating the site of biopsy proven malignancy. A post biopsy hematoma noted measuring 2.3 cm. No other suspicious findings were noted in either breast. 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 Distance of nearest margin anterior 0.6 cm. 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 Griffin. 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 She does not have any hot flashes or vaginal dryness   She is on gabapentin for her sciatic pain. She is no longer receiving epidurals for her back pain due to the stenosis being so significant that an epidural is not sufficient to address her pain as per patient.  She had one in  and before that on 10/21. She also had a nerve block in 10/22 for her back pain due to spinal stenosis. She is seeing pain management, Dr. George Watt at [facility]. She is also on gabapentin which is helping. She also takes Advil as needed. She is also taking turmeric and glucosamine chondroitin which she thinks is helpful for her arthritic pain. She feels very blessed to have grandchildren who help her when her back flares up. Resting seems to help with her pain. She would like to be more activity and get in shape, but unfortunately cannot because of the pain that prevents her from standing or walking too much.  HEALTH MAINTENANCE: PCP: Jorje Smith MD Mammogram: 23 BI-RADS 2 Benign finding  Pap smear:  normal Bone density DEXA scan: 2022  showed T scores of -2.2 in femoral neck, 0.9 in spine and - 1.0 in total hip. PRolia due in 20 showed T scores of -2.6 in femoral neck.                                            2018 showed osteoporosis and started Prolia  with her PCP Dr. Smith; repeat in 2020 showed slight improvement Colonoscopy: , no polyps.  initial colonoscopy was not good due to poor bowel prep, so she had another one that was a virtual colonoscopy with no polyps. EGD: , negative too in  Genetic testin20 [facility] 47 gene panel negative

## 2024-06-06 ENCOUNTER — APPOINTMENT (OUTPATIENT)
Dept: HEMATOLOGY ONCOLOGY | Facility: CLINIC | Age: 74
End: 2024-06-06
Payer: MEDICARE

## 2024-06-06 DIAGNOSIS — T45.1X5A PAIN IN UNSPECIFIED JOINT: ICD-10-CM

## 2024-06-06 DIAGNOSIS — M25.50 PAIN IN UNSPECIFIED JOINT: ICD-10-CM

## 2024-06-06 DIAGNOSIS — M81.0 AGE-RELATED OSTEOPOROSIS W/OUT CURRENT PATHOLOGICAL FRACTURE: ICD-10-CM

## 2024-06-06 DIAGNOSIS — C50.912 MALIGNANT NEOPLASM OF UNSPECIFIED SITE OF LEFT FEMALE BREAST: ICD-10-CM

## 2024-06-06 DIAGNOSIS — Z85.3 PERSONAL HISTORY OF MALIGNANT NEOPLASM OF BREAST: ICD-10-CM

## 2024-06-06 PROCEDURE — 99214 OFFICE O/P EST MOD 30 MIN: CPT

## 2024-06-06 PROCEDURE — G2211 COMPLEX E/M VISIT ADD ON: CPT

## 2024-06-06 NOTE — HISTORY OF PRESENT ILLNESS
[Disease: _____________________] : Disease: [unfilled] [T: ___] : T[unfilled] [N: ___] : N[unfilled] [M: ___] : M[unfilled] [AJCC Stage: ____] : AJCC Stage: [unfilled] [de-identified] : Left breast cancer diagnosed at the age of 69. 07/29/19 Screening mammogram showed a left breast density measuring 4 to 5 mm in the left central upper inner quadrant.  08/05/19 Left breast mammogram  and ultrasound showed persistent nodule in the left upper inner quadrant corresponding with a 4 mm irregular hypoechoic nodule in the 10 o'clock axis on corresponding ultrasound. 08/12/19 Left breast 10 o'clock axis 9 cm FN sonoguided core biopsy showed a ~ 0.5 cm IDC with micropapillary features, SBR 8/9, ER 98%, NE 0%, Ki-67 28% and HER-2 equivocal but FISH negative. 08/26/19 MRI of the breast showed a susceptibility artifact from a biopsy marker at the left 10 o'clock axis, indicating the site of biopsy proven malignancy. A post biopsy hematoma noted measuring 2.3 cm. No other suspicious findings were noted in either breast. 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 Distance of nearest margin anterior 0.6 cm. 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 11/22/19 Completed Radiation therapy with Dr. Griffin. 12/2019 Anastrozole started, changed to letrozole in 9/20 due to arthralgias. 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 She does not have any hot flashes or vaginal dryness  She has arthritis in her hands and Dupuytren's contractures so is not sure if the AI is adding to her hand pain. She is on gabapentin for her sciatic pain. She is no longer receiving epidurals for her back pain due to the stenosis being so significant that an epidural is not sufficient to address her pain as per patient.  She had one in  and before that on 10/21. She also had a nerve block in 10/22 for her back pain due to spinal stenosis. She is seeing pain management, Dr. George Watt at [facility]. She is also on gabapentin which is helping. She also takes Advil as needed. She is also taking turmeric and glucosamine chondroitin which she thinks is helpful for her arthritic pain. Her  has been ill this year after being diagnosed with Parkinson's and also C. Diff colitis.  HEALTH MAINTENANCE: PCP: Jorje Smith MD Mammogram: 23 BI-RADS 2  Pap smear:  normal Bone density DEXA scan: 2022 showed T scores of -2.2 in femoral neck, 0.9 in spine and - 1.0 in total hip. She is on Prolia with her internist                                           20 showed T scores of -2.6 in femoral neck.                                            2018 showed osteoporosis and started Prolia  with her PCP Dr. Smith; repeat in 2020 showed slight improvement Colonoscopy: , no polyps.  initial colonoscopy was not good due to poor bowel prep, so she had another one that was a virtual colonoscopy with no polyps. EGD: , negative too in  Genetic testin20 [facility] 47 gene panel negative

## 2024-06-06 NOTE — PHYSICAL EXAM
[Fully active, able to carry on all pre-disease performance without restriction] : Status 0 - Fully active, able to carry on all pre-disease performance without restriction [Normal] : affect appropriate [de-identified] : Normal respiratory effort. Speaking in full sentences without difficulty

## 2024-06-06 NOTE — REASON FOR VISIT
[Home] : at home, [unfilled] , at the time of the visit. [Follow-Up Visit] : a follow-up [Medical Office: ([facility])___] : at the medical office located in  [Patient] : the patient [FreeTextEntry2] : Breast Cancer

## 2024-09-12 ENCOUNTER — OUTPATIENT (OUTPATIENT)
Dept: OUTPATIENT SERVICES | Facility: HOSPITAL | Age: 74
LOS: 1 days | End: 2024-09-12
Payer: MEDICARE

## 2024-09-12 ENCOUNTER — RESULT REVIEW (OUTPATIENT)
Age: 74
End: 2024-09-12

## 2024-09-12 ENCOUNTER — APPOINTMENT (OUTPATIENT)
Dept: MAMMOGRAPHY | Facility: CLINIC | Age: 74
End: 2024-09-12

## 2024-09-12 ENCOUNTER — APPOINTMENT (OUTPATIENT)
Dept: ULTRASOUND IMAGING | Facility: CLINIC | Age: 74
End: 2024-09-12

## 2024-09-12 DIAGNOSIS — Z90.49 ACQUIRED ABSENCE OF OTHER SPECIFIED PARTS OF DIGESTIVE TRACT: Chronic | ICD-10-CM

## 2024-09-12 DIAGNOSIS — C50.912 MALIGNANT NEOPLASM OF UNSPECIFIED SITE OF LEFT FEMALE BREAST: ICD-10-CM

## 2024-09-12 DIAGNOSIS — Z98.51 TUBAL LIGATION STATUS: Chronic | ICD-10-CM

## 2024-09-12 DIAGNOSIS — Z98.891 HISTORY OF UTERINE SCAR FROM PREVIOUS SURGERY: Chronic | ICD-10-CM

## 2024-09-12 DIAGNOSIS — R00.2 PALPITATIONS: Chronic | ICD-10-CM

## 2024-09-12 PROCEDURE — 77066 DX MAMMO INCL CAD BI: CPT | Mod: 26

## 2024-09-12 PROCEDURE — 76641 ULTRASOUND BREAST COMPLETE: CPT | Mod: 26,50,GY

## 2024-09-12 PROCEDURE — G0279: CPT | Mod: 26

## 2024-09-12 PROCEDURE — 77066 DX MAMMO INCL CAD BI: CPT

## 2024-09-12 PROCEDURE — G0279: CPT

## 2024-09-12 PROCEDURE — 76641 ULTRASOUND BREAST COMPLETE: CPT

## 2024-11-05 ENCOUNTER — APPOINTMENT (OUTPATIENT)
Dept: SURGICAL ONCOLOGY | Facility: CLINIC | Age: 74
End: 2024-11-05
Payer: MEDICARE

## 2024-11-05 ENCOUNTER — NON-APPOINTMENT (OUTPATIENT)
Age: 74
End: 2024-11-05

## 2024-11-05 VITALS
OXYGEN SATURATION: 96 % | RESPIRATION RATE: 16 BRPM | HEART RATE: 87 BPM | BODY MASS INDEX: 32.98 KG/M2 | SYSTOLIC BLOOD PRESSURE: 149 MMHG | HEIGHT: 60 IN | WEIGHT: 168 LBS | DIASTOLIC BLOOD PRESSURE: 80 MMHG

## 2024-11-05 DIAGNOSIS — C50.912 MALIGNANT NEOPLASM OF UNSPECIFIED SITE OF LEFT FEMALE BREAST: ICD-10-CM

## 2024-11-05 PROCEDURE — 99213 OFFICE O/P EST LOW 20 MIN: CPT

## 2024-12-31 ENCOUNTER — OUTPATIENT (OUTPATIENT)
Dept: OUTPATIENT SERVICES | Facility: HOSPITAL | Age: 74
LOS: 1 days | Discharge: HOME | End: 2024-12-31

## 2024-12-31 DIAGNOSIS — R00.2 PALPITATIONS: Chronic | ICD-10-CM

## 2024-12-31 DIAGNOSIS — Z98.51 TUBAL LIGATION STATUS: Chronic | ICD-10-CM

## 2024-12-31 DIAGNOSIS — Z90.49 ACQUIRED ABSENCE OF OTHER SPECIFIED PARTS OF DIGESTIVE TRACT: Chronic | ICD-10-CM

## 2024-12-31 DIAGNOSIS — Z98.891 HISTORY OF UTERINE SCAR FROM PREVIOUS SURGERY: Chronic | ICD-10-CM

## 2024-12-31 DIAGNOSIS — C50.919 MALIGNANT NEOPLASM OF UNSPECIFIED SITE OF UNSPECIFIED FEMALE BREAST: ICD-10-CM

## 2025-01-07 ENCOUNTER — NON-APPOINTMENT (OUTPATIENT)
Age: 75
End: 2025-01-07

## 2025-01-07 ENCOUNTER — OUTPATIENT (OUTPATIENT)
Dept: OUTPATIENT SERVICES | Facility: HOSPITAL | Age: 75
LOS: 1 days | Discharge: HOME | End: 2025-01-07

## 2025-01-07 ENCOUNTER — APPOINTMENT (OUTPATIENT)
Dept: HEMATOLOGY ONCOLOGY | Facility: CLINIC | Age: 75
End: 2025-01-07
Payer: MEDICARE

## 2025-01-07 VITALS
HEIGHT: 60.71 IN | DIASTOLIC BLOOD PRESSURE: 84 MMHG | BODY MASS INDEX: 31.84 KG/M2 | RESPIRATION RATE: 17 BRPM | HEART RATE: 78 BPM | SYSTOLIC BLOOD PRESSURE: 146 MMHG | OXYGEN SATURATION: 96 % | WEIGHT: 166.45 LBS | TEMPERATURE: 97.5 F

## 2025-01-07 DIAGNOSIS — M25.50 PAIN IN UNSPECIFIED JOINT: ICD-10-CM

## 2025-01-07 DIAGNOSIS — R00.2 PALPITATIONS: Chronic | ICD-10-CM

## 2025-01-07 DIAGNOSIS — Z98.51 TUBAL LIGATION STATUS: Chronic | ICD-10-CM

## 2025-01-07 DIAGNOSIS — C50.919 MALIGNANT NEOPLASM OF UNSPECIFIED SITE OF UNSPECIFIED FEMALE BREAST: ICD-10-CM

## 2025-01-07 DIAGNOSIS — T45.1X5A PAIN IN UNSPECIFIED JOINT: ICD-10-CM

## 2025-01-07 DIAGNOSIS — C50.912 MALIGNANT NEOPLASM OF UNSPECIFIED SITE OF LEFT FEMALE BREAST: ICD-10-CM

## 2025-01-07 DIAGNOSIS — Z90.49 ACQUIRED ABSENCE OF OTHER SPECIFIED PARTS OF DIGESTIVE TRACT: Chronic | ICD-10-CM

## 2025-01-07 DIAGNOSIS — Z98.891 HISTORY OF UTERINE SCAR FROM PREVIOUS SURGERY: Chronic | ICD-10-CM

## 2025-01-07 DIAGNOSIS — M81.0 AGE-RELATED OSTEOPOROSIS W/OUT CURRENT PATHOLOGICAL FRACTURE: ICD-10-CM

## 2025-01-07 PROCEDURE — G2211 COMPLEX E/M VISIT ADD ON: CPT

## 2025-01-07 PROCEDURE — 99214 OFFICE O/P EST MOD 30 MIN: CPT

## 2025-01-07 RX ORDER — CARVEDILOL 3.12 MG/1
3.12 TABLET, FILM COATED ORAL TWICE DAILY
Refills: 0 | Status: ACTIVE | COMMUNITY
Start: 2025-01-07

## 2025-04-22 NOTE — H&P PST ADULT - TEACHING/LEARNING FACTORS INFLUENCE READINESS TO LEARN
[FreeTextEntry1] : Facial acne. Started age 23. No treatment. Mole on back of neck.  [de-identified] : No personal or family history of cutaneous malignancy.   RN L&SIMEON ELMORE none

## 2025-09-15 ENCOUNTER — APPOINTMENT (OUTPATIENT)
Dept: MAMMOGRAPHY | Facility: CLINIC | Age: 75
End: 2025-09-15
Payer: MEDICARE

## 2025-09-15 ENCOUNTER — RESULT REVIEW (OUTPATIENT)
Age: 75
End: 2025-09-15

## 2025-09-15 ENCOUNTER — APPOINTMENT (OUTPATIENT)
Dept: ULTRASOUND IMAGING | Facility: CLINIC | Age: 75
End: 2025-09-15
Payer: MEDICARE

## 2025-09-15 PROCEDURE — 77063 BREAST TOMOSYNTHESIS BI: CPT | Mod: 26

## 2025-09-15 PROCEDURE — 77067 SCR MAMMO BI INCL CAD: CPT | Mod: 26

## 2025-09-15 PROCEDURE — 76641 ULTRASOUND BREAST COMPLETE: CPT | Mod: 26,50,GA
